# Patient Record
Sex: FEMALE | Race: WHITE | NOT HISPANIC OR LATINO | Employment: OTHER | URBAN - METROPOLITAN AREA
[De-identification: names, ages, dates, MRNs, and addresses within clinical notes are randomized per-mention and may not be internally consistent; named-entity substitution may affect disease eponyms.]

---

## 2021-04-09 ENCOUNTER — HOSPITAL ENCOUNTER (OUTPATIENT)
Facility: MEDICAL CENTER | Age: 56
End: 2021-04-09
Attending: NURSE PRACTITIONER
Payer: MEDICARE

## 2023-08-17 ENCOUNTER — HOSPITAL ENCOUNTER (OUTPATIENT)
Facility: MEDICAL CENTER | Age: 58
End: 2023-08-17
Attending: NEUROLOGICAL SURGERY | Admitting: NEUROLOGICAL SURGERY

## 2023-08-18 ENCOUNTER — APPOINTMENT (OUTPATIENT)
Dept: ADMISSIONS | Facility: MEDICAL CENTER | Age: 58
DRG: 455 | End: 2023-08-18
Attending: NEUROLOGICAL SURGERY
Payer: COMMERCIAL

## 2023-08-23 ENCOUNTER — PRE-ADMISSION TESTING (OUTPATIENT)
Dept: ADMISSIONS | Facility: MEDICAL CENTER | Age: 58
DRG: 455 | End: 2023-08-23
Attending: NEUROLOGICAL SURGERY
Payer: COMMERCIAL

## 2023-08-24 ENCOUNTER — APPOINTMENT (OUTPATIENT)
Dept: ADMISSIONS | Facility: MEDICAL CENTER | Age: 58
DRG: 455 | End: 2023-08-24
Payer: COMMERCIAL

## 2023-08-28 ENCOUNTER — HOSPITAL ENCOUNTER (OUTPATIENT)
Dept: RADIOLOGY | Facility: MEDICAL CENTER | Age: 58
DRG: 455 | End: 2023-08-28
Attending: NEUROLOGICAL SURGERY
Payer: COMMERCIAL

## 2023-08-28 ENCOUNTER — PRE-ADMISSION TESTING (OUTPATIENT)
Dept: ADMISSIONS | Facility: MEDICAL CENTER | Age: 58
DRG: 455 | End: 2023-08-28
Attending: NEUROLOGICAL SURGERY
Payer: COMMERCIAL

## 2023-08-28 DIAGNOSIS — Z01.812 PRE-OPERATIVE LABORATORY EXAMINATION: ICD-10-CM

## 2023-08-28 DIAGNOSIS — Z01.811 PRE-OPERATIVE RESPIRATORY EXAMINATION: ICD-10-CM

## 2023-08-28 DIAGNOSIS — Z01.810 PRE-OPERATIVE CARDIOVASCULAR EXAMINATION: ICD-10-CM

## 2023-08-28 DIAGNOSIS — R82.90 NONSPECIFIC FINDING ON EXAMINATION OF URINE: ICD-10-CM

## 2023-08-28 DIAGNOSIS — R79.1 ABNORMAL COAGULATION PROFILE: ICD-10-CM

## 2023-08-28 DIAGNOSIS — M43.16 SPONDYLOLISTHESIS OF LUMBAR REGION: ICD-10-CM

## 2023-08-28 DIAGNOSIS — R94.31 NONSPECIFIC ABNORMAL ELECTROCARDIOGRAM (ECG) (EKG): ICD-10-CM

## 2023-08-28 LAB
ABO GROUP BLD: NORMAL
ANION GAP SERPL CALC-SCNC: 12 MMOL/L (ref 7–16)
APPEARANCE UR: CLEAR
APTT PPP: 26.3 SEC (ref 24.7–36)
BASOPHILS # BLD AUTO: 0.5 % (ref 0–1.8)
BASOPHILS # BLD: 0.02 K/UL (ref 0–0.12)
BILIRUB UR QL STRIP.AUTO: NEGATIVE
BLD GP AB SCN SERPL QL: NORMAL
BUN SERPL-MCNC: 14 MG/DL (ref 8–22)
CALCIUM SERPL-MCNC: 10.7 MG/DL (ref 8.5–10.5)
CHLORIDE SERPL-SCNC: 101 MMOL/L (ref 96–112)
CO2 SERPL-SCNC: 25 MMOL/L (ref 20–33)
COLOR UR: YELLOW
CREAT SERPL-MCNC: 0.74 MG/DL (ref 0.5–1.4)
EKG IMPRESSION: NORMAL
EOSINOPHIL # BLD AUTO: 0.07 K/UL (ref 0–0.51)
EOSINOPHIL NFR BLD: 1.9 % (ref 0–6.9)
ERYTHROCYTE [DISTWIDTH] IN BLOOD BY AUTOMATED COUNT: 49.9 FL (ref 35.9–50)
GFR SERPLBLD CREATININE-BSD FMLA CKD-EPI: 93 ML/MIN/1.73 M 2
GLUCOSE SERPL-MCNC: 91 MG/DL (ref 65–99)
GLUCOSE UR STRIP.AUTO-MCNC: NEGATIVE MG/DL
HCT VFR BLD AUTO: 42.3 % (ref 37–47)
HGB BLD-MCNC: 13.8 G/DL (ref 12–16)
IMM GRANULOCYTES # BLD AUTO: 0.01 K/UL (ref 0–0.11)
IMM GRANULOCYTES NFR BLD AUTO: 0.3 % (ref 0–0.9)
INR PPP: 0.95 (ref 0.87–1.13)
KETONES UR STRIP.AUTO-MCNC: NEGATIVE MG/DL
LEUKOCYTE ESTERASE UR QL STRIP.AUTO: NEGATIVE
LYMPHOCYTES # BLD AUTO: 1.2 K/UL (ref 1–4.8)
LYMPHOCYTES NFR BLD: 31.7 % (ref 22–41)
MCH RBC QN AUTO: 29.2 PG (ref 27–33)
MCHC RBC AUTO-ENTMCNC: 32.6 G/DL (ref 32.2–35.5)
MCV RBC AUTO: 89.4 FL (ref 81.4–97.8)
MICRO URNS: NORMAL
MONOCYTES # BLD AUTO: 0.37 K/UL (ref 0–0.85)
MONOCYTES NFR BLD AUTO: 9.8 % (ref 0–13.4)
NEUTROPHILS # BLD AUTO: 2.11 K/UL (ref 1.82–7.42)
NEUTROPHILS NFR BLD: 55.8 % (ref 44–72)
NITRITE UR QL STRIP.AUTO: NEGATIVE
NRBC # BLD AUTO: 0 K/UL
NRBC BLD-RTO: 0 /100 WBC (ref 0–0.2)
PH UR STRIP.AUTO: 6.5 [PH] (ref 5–8)
PLATELET # BLD AUTO: 215 K/UL (ref 164–446)
PMV BLD AUTO: 10.9 FL (ref 9–12.9)
POTASSIUM SERPL-SCNC: 3.8 MMOL/L (ref 3.6–5.5)
PROT UR QL STRIP: NEGATIVE MG/DL
PROTHROMBIN TIME: 12.8 SEC (ref 12–14.6)
RBC # BLD AUTO: 4.73 M/UL (ref 4.2–5.4)
RBC UR QL AUTO: NEGATIVE
RH BLD: NORMAL
SODIUM SERPL-SCNC: 138 MMOL/L (ref 135–145)
SP GR UR STRIP.AUTO: 1.01
UROBILINOGEN UR STRIP.AUTO-MCNC: 0.2 MG/DL
WBC # BLD AUTO: 3.8 K/UL (ref 4.8–10.8)

## 2023-08-28 PROCEDURE — 85730 THROMBOPLASTIN TIME PARTIAL: CPT

## 2023-08-28 PROCEDURE — 85610 PROTHROMBIN TIME: CPT

## 2023-08-28 PROCEDURE — 36415 COLL VENOUS BLD VENIPUNCTURE: CPT

## 2023-08-28 PROCEDURE — 71046 X-RAY EXAM CHEST 2 VIEWS: CPT

## 2023-08-28 PROCEDURE — 86850 RBC ANTIBODY SCREEN: CPT

## 2023-08-28 PROCEDURE — 86901 BLOOD TYPING SEROLOGIC RH(D): CPT

## 2023-08-28 PROCEDURE — 86900 BLOOD TYPING SEROLOGIC ABO: CPT

## 2023-08-28 PROCEDURE — 85025 COMPLETE CBC W/AUTO DIFF WBC: CPT

## 2023-08-28 PROCEDURE — 72110 X-RAY EXAM L-2 SPINE 4/>VWS: CPT

## 2023-08-28 PROCEDURE — 81003 URINALYSIS AUTO W/O SCOPE: CPT

## 2023-08-28 PROCEDURE — 80048 BASIC METABOLIC PNL TOTAL CA: CPT

## 2023-08-28 PROCEDURE — 93005 ELECTROCARDIOGRAM TRACING: CPT

## 2023-08-28 PROCEDURE — 93010 ELECTROCARDIOGRAM REPORT: CPT | Performed by: INTERNAL MEDICINE

## 2023-09-04 ENCOUNTER — ANESTHESIA EVENT (OUTPATIENT)
Dept: SURGERY | Facility: MEDICAL CENTER | Age: 58
DRG: 455 | End: 2023-09-04
Payer: COMMERCIAL

## 2023-09-05 ENCOUNTER — APPOINTMENT (OUTPATIENT)
Dept: RADIOLOGY | Facility: MEDICAL CENTER | Age: 58
DRG: 455 | End: 2023-09-05
Attending: NEUROLOGICAL SURGERY
Payer: COMMERCIAL

## 2023-09-05 ENCOUNTER — ANESTHESIA (OUTPATIENT)
Dept: SURGERY | Facility: MEDICAL CENTER | Age: 58
DRG: 455 | End: 2023-09-05
Payer: COMMERCIAL

## 2023-09-05 ENCOUNTER — HOSPITAL ENCOUNTER (INPATIENT)
Facility: MEDICAL CENTER | Age: 58
LOS: 4 days | DRG: 455 | End: 2023-09-09
Attending: NEUROLOGICAL SURGERY | Admitting: NEUROLOGICAL SURGERY
Payer: COMMERCIAL

## 2023-09-05 DIAGNOSIS — G89.18 ACUTE POSTOPERATIVE PAIN: ICD-10-CM

## 2023-09-05 LAB — ABO + RH BLD: NORMAL

## 2023-09-05 PROCEDURE — 700111 HCHG RX REV CODE 636 W/ 250 OVERRIDE (IP): Performed by: PHYSICIAN ASSISTANT

## 2023-09-05 PROCEDURE — 95907 NVR CNDJ TST 1-2 STUDIES: CPT | Performed by: NEUROLOGICAL SURGERY

## 2023-09-05 PROCEDURE — 502000 HCHG MISC OR IMPLANTS RC 0278: Performed by: NEUROLOGICAL SURGERY

## 2023-09-05 PROCEDURE — 110454 HCHG SHELL REV 250: Performed by: NEUROLOGICAL SURGERY

## 2023-09-05 PROCEDURE — 160042 HCHG SURGERY MINUTES - EA ADDL 1 MIN LEVEL 5: Performed by: NEUROLOGICAL SURGERY

## 2023-09-05 PROCEDURE — C1713 ANCHOR/SCREW BN/BN,TIS/BN: HCPCS | Performed by: NEUROLOGICAL SURGERY

## 2023-09-05 PROCEDURE — 700111 HCHG RX REV CODE 636 W/ 250 OVERRIDE (IP): Mod: JZ | Performed by: NEUROLOGICAL SURGERY

## 2023-09-05 PROCEDURE — 700101 HCHG RX REV CODE 250: Performed by: NEUROLOGICAL SURGERY

## 2023-09-05 PROCEDURE — 700102 HCHG RX REV CODE 250 W/ 637 OVERRIDE(OP): Performed by: PHYSICIAN ASSISTANT

## 2023-09-05 PROCEDURE — 160035 HCHG PACU - 1ST 60 MINS PHASE I: Performed by: NEUROLOGICAL SURGERY

## 2023-09-05 PROCEDURE — 0SG00A0 FUSION OF LUMBAR VERTEBRAL JOINT WITH INTERBODY FUSION DEVICE, ANTERIOR APPROACH, ANTERIOR COLUMN, OPEN APPROACH: ICD-10-PCS | Performed by: NEUROLOGICAL SURGERY

## 2023-09-05 PROCEDURE — 160048 HCHG OR STATISTICAL LEVEL 1-5: Performed by: NEUROLOGICAL SURGERY

## 2023-09-05 PROCEDURE — 160009 HCHG ANES TIME/MIN: Performed by: NEUROLOGICAL SURGERY

## 2023-09-05 PROCEDURE — 95939 C MOTOR EVOKED UPR&LWR LIMBS: CPT | Performed by: NEUROLOGICAL SURGERY

## 2023-09-05 PROCEDURE — 95940 IONM IN OPERATNG ROOM 15 MIN: CPT | Performed by: NEUROLOGICAL SURGERY

## 2023-09-05 PROCEDURE — 700105 HCHG RX REV CODE 258: Performed by: NEUROLOGICAL SURGERY

## 2023-09-05 PROCEDURE — 700111 HCHG RX REV CODE 636 W/ 250 OVERRIDE (IP): Performed by: ANESTHESIOLOGY

## 2023-09-05 PROCEDURE — 160031 HCHG SURGERY MINUTES - 1ST 30 MINS LEVEL 5: Performed by: NEUROLOGICAL SURGERY

## 2023-09-05 PROCEDURE — 700101 HCHG RX REV CODE 250: Performed by: ANESTHESIOLOGY

## 2023-09-05 PROCEDURE — A9270 NON-COVERED ITEM OR SERVICE: HCPCS | Performed by: PHYSICIAN ASSISTANT

## 2023-09-05 PROCEDURE — 700105 HCHG RX REV CODE 258: Performed by: ANESTHESIOLOGY

## 2023-09-05 PROCEDURE — 160002 HCHG RECOVERY MINUTES (STAT): Performed by: NEUROLOGICAL SURGERY

## 2023-09-05 PROCEDURE — 95955 EEG DURING SURGERY: CPT | Performed by: NEUROLOGICAL SURGERY

## 2023-09-05 PROCEDURE — 95938 SOMATOSENSORY TESTING: CPT | Performed by: NEUROLOGICAL SURGERY

## 2023-09-05 PROCEDURE — 700105 HCHG RX REV CODE 258: Performed by: PHYSICIAN ASSISTANT

## 2023-09-05 PROCEDURE — 0SB20ZZ EXCISION OF LUMBAR VERTEBRAL DISC, OPEN APPROACH: ICD-10-PCS | Performed by: NEUROLOGICAL SURGERY

## 2023-09-05 PROCEDURE — 160036 HCHG PACU - EA ADDL 30 MINS PHASE I: Performed by: NEUROLOGICAL SURGERY

## 2023-09-05 PROCEDURE — A9270 NON-COVERED ITEM OR SERVICE: HCPCS | Performed by: ANESTHESIOLOGY

## 2023-09-05 PROCEDURE — 770001 HCHG ROOM/CARE - MED/SURG/GYN PRIV*

## 2023-09-05 PROCEDURE — 95861 NEEDLE EMG 2 EXTREMITIES: CPT | Performed by: NEUROLOGICAL SURGERY

## 2023-09-05 PROCEDURE — 36415 COLL VENOUS BLD VENIPUNCTURE: CPT

## 2023-09-05 PROCEDURE — 700102 HCHG RX REV CODE 250 W/ 637 OVERRIDE(OP): Performed by: ANESTHESIOLOGY

## 2023-09-05 PROCEDURE — 72100 X-RAY EXAM L-S SPINE 2/3 VWS: CPT

## 2023-09-05 DEVICE — GRAFT BONE OSTEOSTRAND PLUS LARGE 10CC (1EA): Type: IMPLANTABLE DEVICE | Site: BACK | Status: FUNCTIONAL

## 2023-09-05 DEVICE — IMPLANT LATERAL 3D 23MM X 12MM X 45MM 10 DEG (1EA0: Type: IMPLANTABLE DEVICE | Site: BACK | Status: FUNCTIONAL

## 2023-09-05 DEVICE — SCREW 5.5 X 50MM VARIABLE RP1 ANGLE: Type: IMPLANTABLE DEVICE | Site: BACK | Status: FUNCTIONAL

## 2023-09-05 RX ORDER — METHOCARBAMOL 750 MG/1
750 TABLET, FILM COATED ORAL EVERY 8 HOURS PRN
Status: DISCONTINUED | OUTPATIENT
Start: 2023-09-05 | End: 2023-09-07

## 2023-09-05 RX ORDER — BISACODYL 10 MG
10 SUPPOSITORY, RECTAL RECTAL
Status: DISCONTINUED | OUTPATIENT
Start: 2023-09-05 | End: 2023-09-09 | Stop reason: HOSPADM

## 2023-09-05 RX ORDER — LABETALOL HYDROCHLORIDE 5 MG/ML
5 INJECTION, SOLUTION INTRAVENOUS
Status: DISCONTINUED | OUTPATIENT
Start: 2023-09-05 | End: 2023-09-05

## 2023-09-05 RX ORDER — HYDROMORPHONE HYDROCHLORIDE 1 MG/ML
0.2 INJECTION, SOLUTION INTRAMUSCULAR; INTRAVENOUS; SUBCUTANEOUS
Status: DISCONTINUED | OUTPATIENT
Start: 2023-09-05 | End: 2023-09-05 | Stop reason: HOSPADM

## 2023-09-05 RX ORDER — DOCUSATE SODIUM 100 MG/1
100 CAPSULE, LIQUID FILLED ORAL 2 TIMES DAILY
Status: DISCONTINUED | OUTPATIENT
Start: 2023-09-05 | End: 2023-09-09 | Stop reason: HOSPADM

## 2023-09-05 RX ORDER — ONDANSETRON 4 MG/1
4 TABLET, ORALLY DISINTEGRATING ORAL EVERY 4 HOURS PRN
Status: DISCONTINUED | OUTPATIENT
Start: 2023-09-05 | End: 2023-09-09 | Stop reason: HOSPADM

## 2023-09-05 RX ORDER — PROCHLORPERAZINE EDISYLATE 5 MG/ML
5-10 INJECTION INTRAMUSCULAR; INTRAVENOUS EVERY 4 HOURS PRN
Status: DISCONTINUED | OUTPATIENT
Start: 2023-09-05 | End: 2023-09-09 | Stop reason: HOSPADM

## 2023-09-05 RX ORDER — ACETAMINOPHEN 500 MG
1000 TABLET ORAL ONCE
Status: COMPLETED | OUTPATIENT
Start: 2023-09-05 | End: 2023-09-05

## 2023-09-05 RX ORDER — LABETALOL HYDROCHLORIDE 5 MG/ML
10 INJECTION, SOLUTION INTRAVENOUS
Status: DISCONTINUED | OUTPATIENT
Start: 2023-09-05 | End: 2023-09-09 | Stop reason: HOSPADM

## 2023-09-05 RX ORDER — AMOXICILLIN 250 MG
1 CAPSULE ORAL NIGHTLY
Status: DISCONTINUED | OUTPATIENT
Start: 2023-09-05 | End: 2023-09-09 | Stop reason: HOSPADM

## 2023-09-05 RX ORDER — ENEMA 19; 7 G/133ML; G/133ML
1 ENEMA RECTAL
Status: DISCONTINUED | OUTPATIENT
Start: 2023-09-05 | End: 2023-09-09 | Stop reason: HOSPADM

## 2023-09-05 RX ORDER — GABAPENTIN 300 MG/1
300 CAPSULE ORAL ONCE
Status: COMPLETED | OUTPATIENT
Start: 2023-09-05 | End: 2023-09-05

## 2023-09-05 RX ORDER — PROMETHAZINE HYDROCHLORIDE 25 MG/1
12.5-25 TABLET ORAL EVERY 4 HOURS PRN
Status: DISCONTINUED | OUTPATIENT
Start: 2023-09-05 | End: 2023-09-09 | Stop reason: HOSPADM

## 2023-09-05 RX ORDER — DIPHENHYDRAMINE HYDROCHLORIDE 50 MG/ML
12.5 INJECTION INTRAMUSCULAR; INTRAVENOUS
Status: DISCONTINUED | OUTPATIENT
Start: 2023-09-05 | End: 2023-09-05 | Stop reason: HOSPADM

## 2023-09-05 RX ORDER — EPHEDRINE SULFATE 50 MG/ML
5 INJECTION, SOLUTION INTRAVENOUS
Status: DISCONTINUED | OUTPATIENT
Start: 2023-09-05 | End: 2023-09-05 | Stop reason: HOSPADM

## 2023-09-05 RX ORDER — MIDAZOLAM HYDROCHLORIDE 1 MG/ML
1 INJECTION INTRAMUSCULAR; INTRAVENOUS
Status: DISCONTINUED | OUTPATIENT
Start: 2023-09-05 | End: 2023-09-05 | Stop reason: HOSPADM

## 2023-09-05 RX ORDER — OXYCODONE HCL 10 MG/1
10 TABLET, FILM COATED, EXTENDED RELEASE ORAL ONCE
Status: COMPLETED | OUTPATIENT
Start: 2023-09-05 | End: 2023-09-05

## 2023-09-05 RX ORDER — CEFAZOLIN SODIUM 1 G/3ML
INJECTION, POWDER, FOR SOLUTION INTRAMUSCULAR; INTRAVENOUS
Status: DISCONTINUED | OUTPATIENT
Start: 2023-09-05 | End: 2023-09-05 | Stop reason: HOSPADM

## 2023-09-05 RX ORDER — DIPHENHYDRAMINE HCL 25 MG
25 TABLET ORAL EVERY 6 HOURS PRN
Status: DISCONTINUED | OUTPATIENT
Start: 2023-09-05 | End: 2023-09-09 | Stop reason: HOSPADM

## 2023-09-05 RX ORDER — HYDROCODONE BITARTRATE AND ACETAMINOPHEN 10; 325 MG/1; MG/1
1 TABLET ORAL EVERY 4 HOURS PRN
Status: DISCONTINUED | OUTPATIENT
Start: 2023-09-05 | End: 2023-09-09 | Stop reason: HOSPADM

## 2023-09-05 RX ORDER — ONDANSETRON 2 MG/ML
4 INJECTION INTRAMUSCULAR; INTRAVENOUS EVERY 4 HOURS PRN
Status: DISCONTINUED | OUTPATIENT
Start: 2023-09-05 | End: 2023-09-09 | Stop reason: HOSPADM

## 2023-09-05 RX ORDER — HALOPERIDOL 5 MG/ML
1 INJECTION INTRAMUSCULAR
Status: DISCONTINUED | OUTPATIENT
Start: 2023-09-05 | End: 2023-09-05 | Stop reason: HOSPADM

## 2023-09-05 RX ORDER — SODIUM CHLORIDE, SODIUM LACTATE, POTASSIUM CHLORIDE, CALCIUM CHLORIDE 600; 310; 30; 20 MG/100ML; MG/100ML; MG/100ML; MG/100ML
INJECTION, SOLUTION INTRAVENOUS CONTINUOUS
Status: ACTIVE | OUTPATIENT
Start: 2023-09-05 | End: 2023-09-05

## 2023-09-05 RX ORDER — AMOXICILLIN 250 MG
1 CAPSULE ORAL
Status: DISCONTINUED | OUTPATIENT
Start: 2023-09-05 | End: 2023-09-09 | Stop reason: HOSPADM

## 2023-09-05 RX ORDER — CALCIUM CARBONATE 500 MG/1
500 TABLET, CHEWABLE ORAL 2 TIMES DAILY
Status: DISCONTINUED | OUTPATIENT
Start: 2023-09-05 | End: 2023-09-09 | Stop reason: HOSPADM

## 2023-09-05 RX ORDER — HYDROMORPHONE HYDROCHLORIDE 1 MG/ML
0.5 INJECTION, SOLUTION INTRAMUSCULAR; INTRAVENOUS; SUBCUTANEOUS
Status: DISCONTINUED | OUTPATIENT
Start: 2023-09-05 | End: 2023-09-07

## 2023-09-05 RX ORDER — ONDANSETRON 2 MG/ML
4 INJECTION INTRAMUSCULAR; INTRAVENOUS
Status: COMPLETED | OUTPATIENT
Start: 2023-09-05 | End: 2023-09-05

## 2023-09-05 RX ORDER — SODIUM CHLORIDE 9 MG/ML
INJECTION, SOLUTION INTRAVENOUS CONTINUOUS
Status: DISCONTINUED | OUTPATIENT
Start: 2023-09-05 | End: 2023-09-09 | Stop reason: HOSPADM

## 2023-09-05 RX ORDER — DEXAMETHASONE SODIUM PHOSPHATE 4 MG/ML
INJECTION, SOLUTION INTRA-ARTICULAR; INTRALESIONAL; INTRAMUSCULAR; INTRAVENOUS; SOFT TISSUE PRN
Status: DISCONTINUED | OUTPATIENT
Start: 2023-09-05 | End: 2023-09-05 | Stop reason: SURG

## 2023-09-05 RX ORDER — POLYETHYLENE GLYCOL 3350 17 G/17G
1 POWDER, FOR SOLUTION ORAL 2 TIMES DAILY PRN
Status: DISCONTINUED | OUTPATIENT
Start: 2023-09-05 | End: 2023-09-07

## 2023-09-05 RX ORDER — PROMETHAZINE HYDROCHLORIDE 25 MG/1
12.5-25 SUPPOSITORY RECTAL EVERY 4 HOURS PRN
Status: DISCONTINUED | OUTPATIENT
Start: 2023-09-05 | End: 2023-09-09 | Stop reason: HOSPADM

## 2023-09-05 RX ORDER — ONDANSETRON 2 MG/ML
INJECTION INTRAMUSCULAR; INTRAVENOUS PRN
Status: DISCONTINUED | OUTPATIENT
Start: 2023-09-05 | End: 2023-09-05 | Stop reason: SURG

## 2023-09-05 RX ORDER — MIDAZOLAM HYDROCHLORIDE 1 MG/ML
INJECTION INTRAMUSCULAR; INTRAVENOUS PRN
Status: DISCONTINUED | OUTPATIENT
Start: 2023-09-05 | End: 2023-09-05 | Stop reason: SURG

## 2023-09-05 RX ORDER — HYDROMORPHONE HYDROCHLORIDE 1 MG/ML
0.1 INJECTION, SOLUTION INTRAMUSCULAR; INTRAVENOUS; SUBCUTANEOUS
Status: DISCONTINUED | OUTPATIENT
Start: 2023-09-05 | End: 2023-09-05 | Stop reason: HOSPADM

## 2023-09-05 RX ORDER — LIDOCAINE HYDROCHLORIDE 20 MG/ML
INJECTION, SOLUTION EPIDURAL; INFILTRATION; INTRACAUDAL; PERINEURAL PRN
Status: DISCONTINUED | OUTPATIENT
Start: 2023-09-05 | End: 2023-09-05 | Stop reason: SURG

## 2023-09-05 RX ORDER — PHENYLEPHRINE HYDROCHLORIDE 10 MG/ML
INJECTION, SOLUTION INTRAMUSCULAR; INTRAVENOUS; SUBCUTANEOUS PRN
Status: DISCONTINUED | OUTPATIENT
Start: 2023-09-05 | End: 2023-09-05 | Stop reason: SURG

## 2023-09-05 RX ORDER — SODIUM CHLORIDE, SODIUM LACTATE, POTASSIUM CHLORIDE, CALCIUM CHLORIDE 600; 310; 30; 20 MG/100ML; MG/100ML; MG/100ML; MG/100ML
INJECTION, SOLUTION INTRAVENOUS CONTINUOUS
Status: DISCONTINUED | OUTPATIENT
Start: 2023-09-05 | End: 2023-09-05 | Stop reason: HOSPADM

## 2023-09-05 RX ORDER — MEPERIDINE HYDROCHLORIDE 25 MG/ML
12.5 INJECTION INTRAMUSCULAR; INTRAVENOUS; SUBCUTANEOUS
Status: DISCONTINUED | OUTPATIENT
Start: 2023-09-05 | End: 2023-09-05 | Stop reason: HOSPADM

## 2023-09-05 RX ORDER — HYDRALAZINE HYDROCHLORIDE 20 MG/ML
5 INJECTION INTRAMUSCULAR; INTRAVENOUS
Status: DISCONTINUED | OUTPATIENT
Start: 2023-09-05 | End: 2023-09-05 | Stop reason: HOSPADM

## 2023-09-05 RX ORDER — CEFAZOLIN SODIUM 1 G/3ML
INJECTION, POWDER, FOR SOLUTION INTRAMUSCULAR; INTRAVENOUS PRN
Status: DISCONTINUED | OUTPATIENT
Start: 2023-09-05 | End: 2023-09-05 | Stop reason: SURG

## 2023-09-05 RX ORDER — HYDROMORPHONE HYDROCHLORIDE 2 MG/ML
INJECTION, SOLUTION INTRAMUSCULAR; INTRAVENOUS; SUBCUTANEOUS PRN
Status: DISCONTINUED | OUTPATIENT
Start: 2023-09-05 | End: 2023-09-05 | Stop reason: SURG

## 2023-09-05 RX ORDER — DIPHENHYDRAMINE HYDROCHLORIDE 50 MG/ML
25 INJECTION INTRAMUSCULAR; INTRAVENOUS EVERY 6 HOURS PRN
Status: DISCONTINUED | OUTPATIENT
Start: 2023-09-05 | End: 2023-09-09 | Stop reason: HOSPADM

## 2023-09-05 RX ORDER — BUPIVACAINE HYDROCHLORIDE AND EPINEPHRINE 5; 5 MG/ML; UG/ML
INJECTION, SOLUTION PERINEURAL
Status: DISCONTINUED | OUTPATIENT
Start: 2023-09-05 | End: 2023-09-05 | Stop reason: HOSPADM

## 2023-09-05 RX ORDER — ENOXAPARIN SODIUM 100 MG/ML
40 INJECTION SUBCUTANEOUS DAILY
Status: DISCONTINUED | OUTPATIENT
Start: 2023-09-06 | End: 2023-09-07

## 2023-09-05 RX ORDER — ALPRAZOLAM 0.25 MG/1
0.25 TABLET ORAL 2 TIMES DAILY PRN
Status: DISCONTINUED | OUTPATIENT
Start: 2023-09-05 | End: 2023-09-09 | Stop reason: HOSPADM

## 2023-09-05 RX ORDER — HYDROMORPHONE HYDROCHLORIDE 1 MG/ML
0.4 INJECTION, SOLUTION INTRAMUSCULAR; INTRAVENOUS; SUBCUTANEOUS
Status: DISCONTINUED | OUTPATIENT
Start: 2023-09-05 | End: 2023-09-05 | Stop reason: HOSPADM

## 2023-09-05 RX ORDER — OXYCODONE HYDROCHLORIDE 5 MG/1
5 TABLET ORAL EVERY 4 HOURS PRN
Status: DISCONTINUED | OUTPATIENT
Start: 2023-09-05 | End: 2023-09-09 | Stop reason: HOSPADM

## 2023-09-05 RX ADMIN — ANTACID TABLETS 500 MG: 500 TABLET, CHEWABLE ORAL at 17:30

## 2023-09-05 RX ADMIN — GABAPENTIN 300 MG: 300 CAPSULE ORAL at 11:11

## 2023-09-05 RX ADMIN — FENTANYL CITRATE 100 MCG: 50 INJECTION, SOLUTION INTRAMUSCULAR; INTRAVENOUS at 11:53

## 2023-09-05 RX ADMIN — MEPERIDINE HYDROCHLORIDE 12.5 MG: 25 INJECTION INTRAMUSCULAR; INTRAVENOUS; SUBCUTANEOUS at 14:20

## 2023-09-05 RX ADMIN — HYDROCODONE BITARTRATE AND ACETAMINOPHEN 1 TABLET: 10; 325 TABLET ORAL at 17:30

## 2023-09-05 RX ADMIN — SODIUM CHLORIDE, POTASSIUM CHLORIDE, SODIUM LACTATE AND CALCIUM CHLORIDE: 600; 310; 30; 20 INJECTION, SOLUTION INTRAVENOUS at 11:50

## 2023-09-05 RX ADMIN — PROPOFOL 100 MCG/KG/MIN: 10 INJECTION, EMULSION INTRAVENOUS at 12:42

## 2023-09-05 RX ADMIN — SENNOSIDES AND DOCUSATE SODIUM 1 TABLET: 50; 8.6 TABLET ORAL at 20:39

## 2023-09-05 RX ADMIN — HYDROMORPHONE HYDROCHLORIDE 1 MG: 2 INJECTION INTRAMUSCULAR; INTRAVENOUS; SUBCUTANEOUS at 12:14

## 2023-09-05 RX ADMIN — DOCUSATE SODIUM 100 MG: 100 CAPSULE, LIQUID FILLED ORAL at 17:30

## 2023-09-05 RX ADMIN — LIDOCAINE HYDROCHLORIDE 0.5 ML: 10 INJECTION, SOLUTION EPIDURAL; INFILTRATION; INTRACAUDAL at 11:11

## 2023-09-05 RX ADMIN — MEPERIDINE HYDROCHLORIDE 12.5 MG: 25 INJECTION INTRAMUSCULAR; INTRAVENOUS; SUBCUTANEOUS at 14:13

## 2023-09-05 RX ADMIN — PROPOFOL 150 MG: 10 INJECTION, EMULSION INTRAVENOUS at 11:55

## 2023-09-05 RX ADMIN — MIDAZOLAM 2 MG: 1 INJECTION, SOLUTION INTRAMUSCULAR; INTRAVENOUS at 11:50

## 2023-09-05 RX ADMIN — ROCURONIUM BROMIDE 35 MG: 10 INJECTION, SOLUTION INTRAVENOUS at 11:55

## 2023-09-05 RX ADMIN — METHOCARBAMOL 1000 MG: 100 INJECTION, SOLUTION INTRAMUSCULAR; INTRAVENOUS at 13:34

## 2023-09-05 RX ADMIN — HALOPERIDOL LACTATE 1 MG: 5 INJECTION, SOLUTION INTRAMUSCULAR at 14:01

## 2023-09-05 RX ADMIN — CEFAZOLIN 2 G: 2 INJECTION, POWDER, FOR SOLUTION INTRAMUSCULAR; INTRAVENOUS at 20:44

## 2023-09-05 RX ADMIN — LIDOCAINE HYDROCHLORIDE 50 MG: 20 INJECTION, SOLUTION EPIDURAL; INFILTRATION; INTRACAUDAL at 11:53

## 2023-09-05 RX ADMIN — KETAMINE HYDROCHLORIDE 25 MG: 100 INJECTION INTRAMUSCULAR; INTRAVENOUS at 12:14

## 2023-09-05 RX ADMIN — ACETAMINOPHEN 1000 MG: 500 TABLET, FILM COATED ORAL at 11:11

## 2023-09-05 RX ADMIN — SODIUM CHLORIDE: 9 INJECTION, SOLUTION INTRAVENOUS at 17:36

## 2023-09-05 RX ADMIN — ONDANSETRON 4 MG: 2 INJECTION INTRAMUSCULAR; INTRAVENOUS at 13:21

## 2023-09-05 RX ADMIN — ONDANSETRON 4 MG: 2 INJECTION INTRAMUSCULAR; INTRAVENOUS at 14:01

## 2023-09-05 RX ADMIN — FENTANYL CITRATE 25 MCG: 50 INJECTION, SOLUTION INTRAMUSCULAR; INTRAVENOUS at 12:45

## 2023-09-05 RX ADMIN — OXYCODONE HYDROCHLORIDE 10 MG: 10 TABLET, FILM COATED, EXTENDED RELEASE ORAL at 11:11

## 2023-09-05 RX ADMIN — PHENYLEPHRINE HYDROCHLORIDE 50 MCG: 10 INJECTION INTRAVENOUS at 12:13

## 2023-09-05 RX ADMIN — CEFAZOLIN 2 G: 1 INJECTION, POWDER, FOR SOLUTION INTRAMUSCULAR; INTRAVENOUS at 11:50

## 2023-09-05 RX ADMIN — SUGAMMADEX 200 MG: 100 INJECTION, SOLUTION INTRAVENOUS at 13:21

## 2023-09-05 RX ADMIN — DEXAMETHASONE SODIUM PHOSPHATE 8 MG: 4 INJECTION INTRA-ARTICULAR; INTRALESIONAL; INTRAMUSCULAR; INTRAVENOUS; SOFT TISSUE at 11:59

## 2023-09-05 ASSESSMENT — PAIN DESCRIPTION - PAIN TYPE
TYPE: ACUTE PAIN
TYPE: ACUTE PAIN;SURGICAL PAIN
TYPE: SURGICAL PAIN

## 2023-09-05 NOTE — ANESTHESIA POSTPROCEDURE EVALUATION
Patient: Blanca Alexander    Procedure Summary     Date: 09/05/23 Room / Location: [facility] 07 / SURGERY [facility]    Anesthesia Start: 1150 Anesthesia Stop: 1337    Procedure: STAGE #1 - LUMBAR 3-4 EXTREME LATERAL INTERBODY FUSION LEFT SIDED APPROACH (Left: Back) Diagnosis: (LUMBAR SPONDYLOLISTHESIS)    Surgeons: Odin Monzon M.D. Responsible Provider: Danilo Morgan M.D.    Anesthesia Type: general ASA Status: 2          Final Anesthesia Type: general  Last vitals  BP   Blood Pressure: 125/73    Temp   36.5 °C (97.7 °F)    Pulse   87   Resp   16    SpO2   97 %      Anesthesia Post Evaluation    Patient location during evaluation: PACU  Patient participation: complete - patient participated  Level of consciousness: awake and alert    Airway patency: patent  Anesthetic complications: no  Cardiovascular status: hemodynamically stable  Respiratory status: acceptable  Hydration status: euvolemic    PONV: none          No notable events documented.     Nurse Pain Score: 0 (NPRS)

## 2023-09-05 NOTE — ANESTHESIA TIME REPORT
Anesthesia Start and Stop Event Times     Date Time Event    9/5/2023 1133 Ready for Procedure     1150 Anesthesia Start     1337 Anesthesia Stop        Responsible Staff  09/05/23    Name Role Begin End    Danilo Morgan M.D. Anesth 1150 1337        Overtime Reason:  no overtime (within assigned shift)    Comments:

## 2023-09-05 NOTE — PROGRESS NOTES
2 RN skin check done w/JOVAN Burleson.   Sx incision to left flank-dressing CDI.   No other skin breakdown noted.

## 2023-09-05 NOTE — ANESTHESIA PREPROCEDURE EVALUATION
Case: [id number] Date/Time: 09/05/23 1245    Procedure: STAGE #1 - LUMBAR 3-4 EXTREME LATERAL INTERBODY FUSION LEFT SIDED APPROACH    Pre-op diagnosis: LUMBAR SPONDYLOLISTHESIS    Location: [facility] OR 07 / SURGERY [facility]    Surgeons: PARISH Taylor H&P:  PAST MEDICAL HISTORY:   58 y.o. female who presents for Procedure(s):  STAGE #1 - LUMBAR 3-4 EXTREME LATERAL INTERBODY FUSION LEFT SIDED APPROACH.  She has current and past medical problems significant for:    Patient denies history of seizures or strokes  Patient denies history of diabetes or thyroid disease  Patient denies history of Asthma or COPD  Patient denies history of GERD or esophageal disease  Patient denies history of JODIE  Patient denies history of renal failure  Patient denies history of bleeding disorders    Patient denies history of previous MI, chest pain or shortness of breath  Able to climb 2 flights of stair without dyspnea or angina, > 4 METs     Patient denies history of complications with anesthesia    Past Medical History:   Diagnosis Date   • Acute nasopharyngitis 07/31/2023    24 hour flu - 101.80 temp   • High cholesterol 08/23/2023    diet controlled       SMOKING/ALCOHOL/RECREATIONAL DRUG USE:  Social History     Tobacco Use   • Smoking status: Never   • Smokeless tobacco: Never   Vaping Use   • Vaping Use: Never used   Substance Use Topics   • Alcohol use: Not Currently     Comment: I may have 2 glasses of wine or shot of liquor per month.   • Drug use: Never     Social History     Substance and Sexual Activity   Drug Use Never       PAST SURGICAL HISTORY:  Past Surgical History:   Procedure Laterality Date   • OTHER NEUROLOGICAL SURG  11/20/2015    Lumbar Fusion TLIF   • OTHER  08/16/2009    Right ACL Surgery       ALLERGIES:   No Known Allergies    MEDICATIONS:  No current facility-administered medications on file prior to encounter.     No current outpatient medications on file prior to encounter.       LABS:  No  "results for input(s): \"WBC\", \"RBC\", \"HEMOGLOBIN\", \"HEMATOCRIT\", \"MCV\", \"MCH\", \"RDW\", \"PLATELETCT\", \"MPV\", \"NEUTSPOLYS\", \"LYMPHOCYTES\", \"MONOCYTES\", \"EOSINOPHILS\", \"BASOPHILS\", \"RBCMORPHOLO\" in the last 72 hours.   Lab Results   Component Value Date/Time    SODIUM 138 08/28/2023 1304    POTASSIUM 3.8 08/28/2023 1304    CHLORIDE 101 08/28/2023 1304    CO2 25 08/28/2023 1304    GLUCOSE 91 08/28/2023 1304    BUN 14 08/28/2023 1304    CALCIUM 10.7 (H) 08/28/2023 1304         PREVIOUS ANESTHETICS: See EMR  __________________________________________    Relevant Problems   No relevant active problems       Physical Exam    Airway   Mallampati: II  TM distance: >3 FB  Neck ROM: full       Cardiovascular - normal exam  Rhythm: regular  Rate: normal  (-) murmur     Dental - normal exam           Pulmonary - normal exam  Breath sounds clear to auscultation     Abdominal    Neurological - normal exam                 Anesthesia Plan    ASA 2       Plan - general       Airway plan will be ETT          Induction: intravenous    Postoperative Plan: Postoperative administration of opioids is intended.    Pertinent diagnostic labs and testing reviewed    Informed Consent:    Anesthetic plan and risks discussed with patient.    Use of blood products discussed with: patient whom consented to blood products.         "

## 2023-09-05 NOTE — OR NURSING
Patient arrived to PACU from OR in stable condition.   Report received at 1326.   VSS and initial assessment complete. Medicated for pain per anesthesia. Robaxin from OR hung.  1400 medicated for nausea per orders  1415 medicated for shivering. Pain tolerable and nausea subsided.  1500 pt stable and ready for floor status.    Report given to Fartun ALDANA opportunity given for questions.   Patient discharged to T3 ortho in stable condition, consistent with preoperative status.

## 2023-09-05 NOTE — ANESTHESIA PROCEDURE NOTES
Airway    Date/Time: 9/5/2023 11:58 AM    Performed by: Danilo Morgan M.D.  Authorized by: Danilo Morgan M.D.    Location:  OR  Urgency:  Elective  Indications for Airway Management:  Anesthesia      Spontaneous Ventilation: absent    Sedation Level:  Deep  Preoxygenated: Yes    Patient Position:  Sniffing  Mask Difficulty Assessment:  2 - vent by mask + OA or adjuvant +/- NMBA  Final Airway Type:  Endotracheal airway  Final Endotracheal Airway:  ETT  Cuffed: Yes    Technique Used for Successful ETT Placement:  Direct laryngoscopy    Insertion Site:  Oral  Blade Type:  Ryne  Laryngoscope Blade/Videolaryngoscope Blade Size:  3  ETT Size (mm):  7.0  Measured from:  Lips  ETT to Lips (cm):  21  Placement Verified by: auscultation and capnometry    Cormack-Lehane Classification:  Grade IIa - partial view of glottis  Number of Attempts at Approach:  1   Atraumatic x1 attempt

## 2023-09-05 NOTE — OR SURGEON
Immediate Post OP Note    PreOp Diagnosis: L3-4 DDD, low back pain.       PostOp Diagnosis: Same.       Procedure(s):  STAGE #1 - LUMBAR 3-4 EXTREME LATERAL INTERBODY FUSION LEFT SIDED APPROACH - Wound Class: Clean    Surgeon(s):  Odin Monzon M.D.    Anesthesiologist/Type of Anesthesia:  Anesthesiologist: Danilo Morgan M.D./General    Surgical Staff:  Assistant: Jeovanny Lazo P.A.-C.  Circulator: Michelle Albarado R.N.; Jazmyne Lam R.N.  Scrub Person: Zuleika Helton; Brenda Harper  Radiology Technologist: Agnieszka Carbajal    Specimens removed if any:  * No specimens in log *    Estimated Blood Loss: < 40 cc     Findings: L3-4 DDD, see dictation.     Complications: None.        9/5/2023 1:29 PM Jeovanny Lazo P.A.-C.

## 2023-09-06 ENCOUNTER — HOSPITAL ENCOUNTER (OUTPATIENT)
Dept: RADIOLOGY | Facility: MEDICAL CENTER | Age: 58
End: 2023-09-06
Attending: PHYSICIAN ASSISTANT
Payer: COMMERCIAL

## 2023-09-06 LAB
ANION GAP SERPL CALC-SCNC: 8 MMOL/L (ref 7–16)
BUN SERPL-MCNC: 13 MG/DL (ref 8–22)
CALCIUM SERPL-MCNC: 9.2 MG/DL (ref 8.5–10.5)
CHLORIDE SERPL-SCNC: 106 MMOL/L (ref 96–112)
CO2 SERPL-SCNC: 26 MMOL/L (ref 20–33)
CREAT SERPL-MCNC: 0.66 MG/DL (ref 0.5–1.4)
ERYTHROCYTE [DISTWIDTH] IN BLOOD BY AUTOMATED COUNT: 49.3 FL (ref 35.9–50)
GFR SERPLBLD CREATININE-BSD FMLA CKD-EPI: 101 ML/MIN/1.73 M 2
GLUCOSE SERPL-MCNC: 148 MG/DL (ref 65–99)
HCT VFR BLD AUTO: 37.1 % (ref 37–47)
HGB BLD-MCNC: 12 G/DL (ref 12–16)
MCH RBC QN AUTO: 28.8 PG (ref 27–33)
MCHC RBC AUTO-ENTMCNC: 32.3 G/DL (ref 32.2–35.5)
MCV RBC AUTO: 89.2 FL (ref 81.4–97.8)
PLATELET # BLD AUTO: 211 K/UL (ref 164–446)
PMV BLD AUTO: 10.4 FL (ref 9–12.9)
POTASSIUM SERPL-SCNC: 5.1 MMOL/L (ref 3.6–5.5)
RBC # BLD AUTO: 4.16 M/UL (ref 4.2–5.4)
SODIUM SERPL-SCNC: 140 MMOL/L (ref 135–145)
WBC # BLD AUTO: 8 K/UL (ref 4.8–10.8)

## 2023-09-06 PROCEDURE — 700111 HCHG RX REV CODE 636 W/ 250 OVERRIDE (IP): Performed by: PHYSICIAN ASSISTANT

## 2023-09-06 PROCEDURE — A9270 NON-COVERED ITEM OR SERVICE: HCPCS | Performed by: PHYSICIAN ASSISTANT

## 2023-09-06 PROCEDURE — 80048 BASIC METABOLIC PNL TOTAL CA: CPT

## 2023-09-06 PROCEDURE — 700102 HCHG RX REV CODE 250 W/ 637 OVERRIDE(OP): Performed by: PHYSICIAN ASSISTANT

## 2023-09-06 PROCEDURE — 770001 HCHG ROOM/CARE - MED/SURG/GYN PRIV*

## 2023-09-06 PROCEDURE — 700105 HCHG RX REV CODE 258: Performed by: PHYSICIAN ASSISTANT

## 2023-09-06 PROCEDURE — 85027 COMPLETE CBC AUTOMATED: CPT

## 2023-09-06 PROCEDURE — 72131 CT LUMBAR SPINE W/O DYE: CPT

## 2023-09-06 PROCEDURE — 36415 COLL VENOUS BLD VENIPUNCTURE: CPT

## 2023-09-06 PROCEDURE — 700111 HCHG RX REV CODE 636 W/ 250 OVERRIDE (IP): Mod: JZ | Performed by: STUDENT IN AN ORGANIZED HEALTH CARE EDUCATION/TRAINING PROGRAM

## 2023-09-06 RX ORDER — ENOXAPARIN SODIUM 100 MG/ML
40 INJECTION SUBCUTANEOUS ONCE
Status: COMPLETED | OUTPATIENT
Start: 2023-09-06 | End: 2023-09-06

## 2023-09-06 RX ADMIN — ANTACID TABLETS 500 MG: 500 TABLET, CHEWABLE ORAL at 16:45

## 2023-09-06 RX ADMIN — ENOXAPARIN SODIUM 40 MG: 100 INJECTION SUBCUTANEOUS at 11:28

## 2023-09-06 RX ADMIN — DOCUSATE SODIUM 100 MG: 100 CAPSULE, LIQUID FILLED ORAL at 16:45

## 2023-09-06 RX ADMIN — OXYCODONE HYDROCHLORIDE 5 MG: 5 TABLET ORAL at 16:45

## 2023-09-06 RX ADMIN — SODIUM CHLORIDE: 9 INJECTION, SOLUTION INTRAVENOUS at 04:54

## 2023-09-06 RX ADMIN — BISACODYL 10 MG: 10 SUPPOSITORY RECTAL at 20:26

## 2023-09-06 RX ADMIN — OXYCODONE HYDROCHLORIDE 5 MG: 5 TABLET ORAL at 08:23

## 2023-09-06 RX ADMIN — ANTACID TABLETS 500 MG: 500 TABLET, CHEWABLE ORAL at 04:46

## 2023-09-06 RX ADMIN — HYDROMORPHONE HYDROCHLORIDE 0.5 MG: 1 INJECTION, SOLUTION INTRAMUSCULAR; INTRAVENOUS; SUBCUTANEOUS at 20:20

## 2023-09-06 RX ADMIN — SENNOSIDES AND DOCUSATE SODIUM 1 TABLET: 50; 8.6 TABLET ORAL at 20:25

## 2023-09-06 RX ADMIN — POLYETHYLENE GLYCOL 3350 1 PACKET: 17 POWDER, FOR SOLUTION ORAL at 04:47

## 2023-09-06 RX ADMIN — CEFAZOLIN 2 G: 2 INJECTION, POWDER, FOR SOLUTION INTRAMUSCULAR; INTRAVENOUS at 04:56

## 2023-09-06 RX ADMIN — MAGNESIUM HYDROXIDE 30 ML: 400 SUSPENSION ORAL at 11:32

## 2023-09-06 RX ADMIN — DOCUSATE SODIUM 100 MG: 100 CAPSULE, LIQUID FILLED ORAL at 04:45

## 2023-09-06 RX ADMIN — METHOCARBAMOL 750 MG: 750 TABLET ORAL at 06:16

## 2023-09-06 ASSESSMENT — PAIN DESCRIPTION - PAIN TYPE
TYPE: ACUTE PAIN;SURGICAL PAIN

## 2023-09-06 NOTE — THERAPY
Physical Therapy Contact Note    Patient Name: Blanca Alexander  Age:  58 y.o., Sex:  female  Medical Record #: [id number]  Today's Date: 9/6/2023 09/06/23 0822   Interdisciplinary Plan of Care Collaboration   Collaboration Comments Orders received, chart reviwed. Will wait to do eval after pt has stage 2 surgery.   Session Information   Date / Session Number  9/6 hold (eval)

## 2023-09-06 NOTE — CARE PLAN
The patient is Stable - Low risk of patient condition declining or worsening      Problem: Pain - Standard  Goal: Alleviation of pain or a reduction in pain to the patient’s comfort goal  Outcome: Progressing  Pain managed well at this time.  Will implement appropriate interventions PRN.      Problem: Knowledge Deficit - Standard  Goal: Patient and family/care givers will demonstrate understanding of plan of care, disease process/condition, diagnostic tests and medications  Outcome: Progressing  Patient updated on POC. All questions answered.

## 2023-09-06 NOTE — PROGRESS NOTES
Neurosurgery Progress Note    Subjective:  POD# 1 Stage I Left L3/4 XLIF.   Plan for Stage II L4 HWR, L3/4 redo laminotomy and L3/4 PSF tomorrow.     Mild back pain.  Improvements in pre-op radicular pain.   No new lower extremity symptoms, denied XLIF symptoms to left thigh.   Has ambulated.   Voiding.   Brace at bedside.     Exam:  Dressing CDI.    Motor 5/5 in bilateral lower extremities.   Sensation intact.       BP  Min: 91/64  Max: 140/79  Pulse  Av.2  Min: 48  Max: 94  Resp  Avg: 15.9  Min: 15  Max: 17  Temp  Av.4 °C (97.6 °F)  Min: 36.2 °C (97.2 °F)  Max: 36.8 °C (98.2 °F)  Monitored Temp 2  Av.2 °C (97.2 °F)  Min: 36.2 °C (97.2 °F)  Max: 36.2 °C (97.2 °F)  SpO2  Av.9 %  Min: 94 %  Max: 99 %    No data recorded    Recent Labs     23  0349   WBC 8.0   RBC 4.16*   HEMOGLOBIN 12.0   HEMATOCRIT 37.1   MCV 89.2   MCH 28.8   MCHC 32.3   RDW 49.3   PLATELETCT 211   MPV 10.4     Recent Labs     23  0349   SODIUM 140   POTASSIUM 5.1   CHLORIDE 106   CO2 26   GLUCOSE 148*   BUN 13   CREATININE 0.66   CALCIUM 9.2               Intake/Output                         23 07 - 2359 23 - 23 59      Total [id number] Total                 Intake    I.V.  690.6  -- 690.6  --  -- --    Phenylephrine Volume 0 -- 0 -- -- --    Propofol Volume 40.6 -- 40.6 -- -- --    Volume (mL) (lactated ringers infusion) 650 -- 650 -- -- --    Total Intake 690.6 -- 690.6 -- -- --       Output    Urine  --  -- --  --  -- --    Number of Times Voided 2 x -- 2 x -- -- --    Stool  --  -- --  --  -- --    Number of Times Stooled 1 x -- 1 x -- -- --    Blood  30  -- 30  --  -- --    Est. Blood Loss 30 -- 30 -- -- --    Total Output 30 -- 30 -- -- --       Net I/O     660.6 -- 660.6 -- -- --              Intake/Output Summary (Last 24 hours) at 2023 1049  Last data filed at 2023 1337  Gross per 24 hour   Intake 690.57 ml   Output 30 ml   Net 660.57  ml             enoxaparin (LOVENOX) injection  40 mg Once    Pharmacy Consult Request  1 Each PHARMACY TO DOSE    MD TIMOTHY...DO NOT ADMINISTER NSAIDS or ASPIRIN unless ORDERED By Neurosurgery  1 Each PRN    docusate sodium  100 mg BID    senna-docusate  1 Tablet Nightly    senna-docusate  1 Tablet Q24HRS PRN    polyethylene glycol/lytes  1 Packet BID PRN    magnesium hydroxide  30 mL QDAY PRN    bisacodyl  10 mg Q24HRS PRN    sodium phosphate  1 Each Once PRN    NS   Continuous    [Held by provider] enoxaparin (LOVENOX) injection  40 mg DAILY AT 1800    diphenhydrAMINE  25 mg Q6HRS PRN    Or    diphenhydrAMINE  25 mg Q6HRS PRN    ondansetron  4 mg Q4HRS PRN    ondansetron  4 mg Q4HRS PRN    promethazine  12.5-25 mg Q4HRS PRN    promethazine  12.5-25 mg Q4HRS PRN    prochlorperazine  5-10 mg Q4HRS PRN    methocarbamol  750 mg Q8HRS PRN    ALPRAZolam  0.25 mg BID PRN    labetalol  10 mg Q HOUR PRN    benzocaine-menthol  1 Lozenge Q2HRS PRN    calcium carbonate  500 mg BID    HYDROcodone/acetaminophen  1 Tablet Q4HRS PRN    oxyCODONE immediate-release  5 mg Q4HRS PRN    HYDROmorphone  0.5 mg Q3HRS PRN       Assessment and Plan:  Hospital day # 2  POD# 1  Plan for Stage II L4 HWR, L3/4 redo laminotomy and L3/4 PSF tomorrow.   PT/OT, encourage OOB. Brace when OOB > 5 min.   Ice incision.   VS and labs reviewed.   NPO tonight.   CT pending, ordered.   Questions answered for tomorrow.     Chemical prophylactic DVT therapy: Yes - Lovenox 40mg/qd    Start date/time: one dose today

## 2023-09-06 NOTE — CARE PLAN
Problem: Pain - Standard  Goal: Alleviation of pain or a reduction in pain to the patient’s comfort goal  Outcome: Progressing     Problem: Lumbar/Thoracic Spine Surgery  Goal: Post-Operative Lumbar/Thoracic Spine Surgery: Patient will achieve optimal post-surgical outcomes  Outcome: Progressing     Problem: Neuro Status  Goal: Neuro status will remain stable or improve  Outcome: Progressing   The patient is Stable - Low risk of patient condition declining or worsening    Shift Goals  Clinical Goals: Mobility, pain control, Q 4N checks  Patient Goals: pain control and rest  Family Goals: N/A    Progress made toward(s) clinical / shift goals:      Patient is not progressing towards the following goals:

## 2023-09-07 ENCOUNTER — APPOINTMENT (OUTPATIENT)
Dept: RADIOLOGY | Facility: MEDICAL CENTER | Age: 58
DRG: 455 | End: 2023-09-07
Attending: NEUROLOGICAL SURGERY
Payer: COMMERCIAL

## 2023-09-07 ENCOUNTER — ANESTHESIA EVENT (OUTPATIENT)
Dept: SURGERY | Facility: MEDICAL CENTER | Age: 58
DRG: 455 | End: 2023-09-07
Payer: COMMERCIAL

## 2023-09-07 ENCOUNTER — ANESTHESIA (OUTPATIENT)
Dept: SURGERY | Facility: MEDICAL CENTER | Age: 58
DRG: 455 | End: 2023-09-07
Payer: COMMERCIAL

## 2023-09-07 LAB
ANION GAP SERPL CALC-SCNC: 9 MMOL/L (ref 7–16)
BUN SERPL-MCNC: 12 MG/DL (ref 8–22)
CALCIUM SERPL-MCNC: 9.6 MG/DL (ref 8.5–10.5)
CHLORIDE SERPL-SCNC: 101 MMOL/L (ref 96–112)
CO2 SERPL-SCNC: 27 MMOL/L (ref 20–33)
CREAT SERPL-MCNC: 0.59 MG/DL (ref 0.5–1.4)
ERYTHROCYTE [DISTWIDTH] IN BLOOD BY AUTOMATED COUNT: 50.3 FL (ref 35.9–50)
GFR SERPLBLD CREATININE-BSD FMLA CKD-EPI: 104 ML/MIN/1.73 M 2
GLUCOSE SERPL-MCNC: 118 MG/DL (ref 65–99)
HCT VFR BLD AUTO: 37.5 % (ref 37–47)
HGB BLD-MCNC: 12.2 G/DL (ref 12–16)
MCH RBC QN AUTO: 29 PG (ref 27–33)
MCHC RBC AUTO-ENTMCNC: 32.5 G/DL (ref 32.2–35.5)
MCV RBC AUTO: 89.1 FL (ref 81.4–97.8)
PLATELET # BLD AUTO: 218 K/UL (ref 164–446)
PMV BLD AUTO: 10.3 FL (ref 9–12.9)
POTASSIUM SERPL-SCNC: 4 MMOL/L (ref 3.6–5.5)
RBC # BLD AUTO: 4.21 M/UL (ref 4.2–5.4)
SODIUM SERPL-SCNC: 137 MMOL/L (ref 135–145)
WBC # BLD AUTO: 8.9 K/UL (ref 4.8–10.8)

## 2023-09-07 PROCEDURE — 700101 HCHG RX REV CODE 250: Performed by: ANESTHESIOLOGY

## 2023-09-07 PROCEDURE — 700101 HCHG RX REV CODE 250: Performed by: NEUROLOGICAL SURGERY

## 2023-09-07 PROCEDURE — 110371 HCHG SHELL REV 272: Performed by: NEUROLOGICAL SURGERY

## 2023-09-07 PROCEDURE — 502000 HCHG MISC OR IMPLANTS RC 0278: Performed by: NEUROLOGICAL SURGERY

## 2023-09-07 PROCEDURE — 80048 BASIC METABOLIC PNL TOTAL CA: CPT

## 2023-09-07 PROCEDURE — 0QP004Z REMOVAL OF INTERNAL FIXATION DEVICE FROM LUMBAR VERTEBRA, OPEN APPROACH: ICD-10-PCS | Performed by: NEUROLOGICAL SURGERY

## 2023-09-07 PROCEDURE — 36415 COLL VENOUS BLD VENIPUNCTURE: CPT

## 2023-09-07 PROCEDURE — 95955 EEG DURING SURGERY: CPT | Performed by: NEUROLOGICAL SURGERY

## 2023-09-07 PROCEDURE — 700111 HCHG RX REV CODE 636 W/ 250 OVERRIDE (IP): Performed by: ANESTHESIOLOGY

## 2023-09-07 PROCEDURE — 95940 IONM IN OPERATNG ROOM 15 MIN: CPT | Performed by: NEUROLOGICAL SURGERY

## 2023-09-07 PROCEDURE — 95937 NEUROMUSCULAR JUNCTION TEST: CPT | Performed by: NEUROLOGICAL SURGERY

## 2023-09-07 PROCEDURE — 160031 HCHG SURGERY MINUTES - 1ST 30 MINS LEVEL 5: Performed by: NEUROLOGICAL SURGERY

## 2023-09-07 PROCEDURE — 502240 HCHG MISC OR SUPPLY RC 0272: Performed by: NEUROLOGICAL SURGERY

## 2023-09-07 PROCEDURE — 85027 COMPLETE CBC AUTOMATED: CPT

## 2023-09-07 PROCEDURE — C1713 ANCHOR/SCREW BN/BN,TIS/BN: HCPCS | Performed by: NEUROLOGICAL SURGERY

## 2023-09-07 PROCEDURE — C1755 CATHETER, INTRASPINAL: HCPCS | Performed by: NEUROLOGICAL SURGERY

## 2023-09-07 PROCEDURE — 700102 HCHG RX REV CODE 250 W/ 637 OVERRIDE(OP): Performed by: PHYSICIAN ASSISTANT

## 2023-09-07 PROCEDURE — 160009 HCHG ANES TIME/MIN: Performed by: NEUROLOGICAL SURGERY

## 2023-09-07 PROCEDURE — 0SG1071 FUSION OF 2 OR MORE LUMBAR VERTEBRAL JOINTS WITH AUTOLOGOUS TISSUE SUBSTITUTE, POSTERIOR APPROACH, POSTERIOR COLUMN, OPEN APPROACH: ICD-10-PCS | Performed by: NEUROLOGICAL SURGERY

## 2023-09-07 PROCEDURE — A9270 NON-COVERED ITEM OR SERVICE: HCPCS | Performed by: ANESTHESIOLOGY

## 2023-09-07 PROCEDURE — 95938 SOMATOSENSORY TESTING: CPT | Performed by: NEUROLOGICAL SURGERY

## 2023-09-07 PROCEDURE — 700102 HCHG RX REV CODE 250 W/ 637 OVERRIDE(OP): Performed by: ANESTHESIOLOGY

## 2023-09-07 PROCEDURE — 700111 HCHG RX REV CODE 636 W/ 250 OVERRIDE (IP): Performed by: PHYSICIAN ASSISTANT

## 2023-09-07 PROCEDURE — 8E0WXBZ COMPUTER ASSISTED PROCEDURE OF TRUNK REGION: ICD-10-PCS | Performed by: NEUROLOGICAL SURGERY

## 2023-09-07 PROCEDURE — 95861 NEEDLE EMG 2 EXTREMITIES: CPT | Performed by: NEUROLOGICAL SURGERY

## 2023-09-07 PROCEDURE — 700105 HCHG RX REV CODE 258: Performed by: ANESTHESIOLOGY

## 2023-09-07 PROCEDURE — 160048 HCHG OR STATISTICAL LEVEL 1-5: Performed by: NEUROLOGICAL SURGERY

## 2023-09-07 PROCEDURE — A9270 NON-COVERED ITEM OR SERVICE: HCPCS | Performed by: PHYSICIAN ASSISTANT

## 2023-09-07 PROCEDURE — 770001 HCHG ROOM/CARE - MED/SURG/GYN PRIV*

## 2023-09-07 PROCEDURE — 95907 NVR CNDJ TST 1-2 STUDIES: CPT | Performed by: NEUROLOGICAL SURGERY

## 2023-09-07 PROCEDURE — 160002 HCHG RECOVERY MINUTES (STAT): Performed by: NEUROLOGICAL SURGERY

## 2023-09-07 PROCEDURE — 160042 HCHG SURGERY MINUTES - EA ADDL 1 MIN LEVEL 5: Performed by: NEUROLOGICAL SURGERY

## 2023-09-07 PROCEDURE — 110454 HCHG SHELL REV 250: Performed by: NEUROLOGICAL SURGERY

## 2023-09-07 PROCEDURE — 700111 HCHG RX REV CODE 636 W/ 250 OVERRIDE (IP): Mod: JZ | Performed by: NEUROLOGICAL SURGERY

## 2023-09-07 PROCEDURE — 72100 X-RAY EXAM L-S SPINE 2/3 VWS: CPT

## 2023-09-07 PROCEDURE — 160035 HCHG PACU - 1ST 60 MINS PHASE I: Performed by: NEUROLOGICAL SURGERY

## 2023-09-07 PROCEDURE — 700105 HCHG RX REV CODE 258: Performed by: PHYSICIAN ASSISTANT

## 2023-09-07 DEVICE — SET SCREW POINTLOCK (1EA): Type: IMPLANTABLE DEVICE | Site: BACK | Status: FUNCTIONAL

## 2023-09-07 DEVICE — IMPLANTABLE DEVICE: Type: IMPLANTABLE DEVICE | Site: BACK | Status: FUNCTIONAL

## 2023-09-07 RX ORDER — BUPIVACAINE HYDROCHLORIDE AND EPINEPHRINE 5; 5 MG/ML; UG/ML
INJECTION, SOLUTION PERINEURAL
Status: DISCONTINUED | OUTPATIENT
Start: 2023-09-07 | End: 2023-09-07 | Stop reason: HOSPADM

## 2023-09-07 RX ORDER — KETOROLAC TROMETHAMINE 30 MG/ML
15 INJECTION, SOLUTION INTRAMUSCULAR; INTRAVENOUS EVERY 8 HOURS PRN
Status: DISCONTINUED | OUTPATIENT
Start: 2023-09-07 | End: 2023-09-08

## 2023-09-07 RX ORDER — ONDANSETRON 2 MG/ML
4 INJECTION INTRAMUSCULAR; INTRAVENOUS
Status: DISCONTINUED | OUTPATIENT
Start: 2023-09-07 | End: 2023-09-07 | Stop reason: HOSPADM

## 2023-09-07 RX ORDER — DEXAMETHASONE SODIUM PHOSPHATE 4 MG/ML
INJECTION, SOLUTION INTRA-ARTICULAR; INTRALESIONAL; INTRAMUSCULAR; INTRAVENOUS; SOFT TISSUE PRN
Status: DISCONTINUED | OUTPATIENT
Start: 2023-09-07 | End: 2023-09-07 | Stop reason: SURG

## 2023-09-07 RX ORDER — SODIUM CHLORIDE, SODIUM LACTATE, POTASSIUM CHLORIDE, CALCIUM CHLORIDE 600; 310; 30; 20 MG/100ML; MG/100ML; MG/100ML; MG/100ML
INJECTION, SOLUTION INTRAVENOUS CONTINUOUS
Status: DISCONTINUED | OUTPATIENT
Start: 2023-09-07 | End: 2023-09-07 | Stop reason: HOSPADM

## 2023-09-07 RX ORDER — LIDOCAINE HYDROCHLORIDE 20 MG/ML
INJECTION, SOLUTION EPIDURAL; INFILTRATION; INTRACAUDAL; PERINEURAL PRN
Status: DISCONTINUED | OUTPATIENT
Start: 2023-09-07 | End: 2023-09-07 | Stop reason: SURG

## 2023-09-07 RX ORDER — EPHEDRINE SULFATE 50 MG/ML
5 INJECTION, SOLUTION INTRAVENOUS
Status: DISCONTINUED | OUTPATIENT
Start: 2023-09-07 | End: 2023-09-07 | Stop reason: HOSPADM

## 2023-09-07 RX ORDER — ROCURONIUM BROMIDE 10 MG/ML
INJECTION, SOLUTION INTRAVENOUS PRN
Status: DISCONTINUED | OUTPATIENT
Start: 2023-09-07 | End: 2023-09-07 | Stop reason: SURG

## 2023-09-07 RX ORDER — ONDANSETRON 2 MG/ML
INJECTION INTRAMUSCULAR; INTRAVENOUS PRN
Status: DISCONTINUED | OUTPATIENT
Start: 2023-09-07 | End: 2023-09-07 | Stop reason: SURG

## 2023-09-07 RX ORDER — DIPHENHYDRAMINE HYDROCHLORIDE 50 MG/ML
12.5 INJECTION INTRAMUSCULAR; INTRAVENOUS
Status: DISCONTINUED | OUTPATIENT
Start: 2023-09-07 | End: 2023-09-07 | Stop reason: HOSPADM

## 2023-09-07 RX ORDER — OXYCODONE HCL 5 MG/5 ML
10 SOLUTION, ORAL ORAL
Status: COMPLETED | OUTPATIENT
Start: 2023-09-07 | End: 2023-09-07

## 2023-09-07 RX ORDER — HYDRALAZINE HYDROCHLORIDE 20 MG/ML
5 INJECTION INTRAMUSCULAR; INTRAVENOUS
Status: DISCONTINUED | OUTPATIENT
Start: 2023-09-07 | End: 2023-09-07 | Stop reason: HOSPADM

## 2023-09-07 RX ORDER — PHENYLEPHRINE HCL IN 0.9% NACL 0.5 MG/5ML
SYRINGE (ML) INTRAVENOUS PRN
Status: DISCONTINUED | OUTPATIENT
Start: 2023-09-07 | End: 2023-09-07 | Stop reason: SURG

## 2023-09-07 RX ORDER — OXYCODONE HCL 5 MG/5 ML
5 SOLUTION, ORAL ORAL
Status: COMPLETED | OUTPATIENT
Start: 2023-09-07 | End: 2023-09-07

## 2023-09-07 RX ORDER — CEFAZOLIN SODIUM 1 G/3ML
INJECTION, POWDER, FOR SOLUTION INTRAMUSCULAR; INTRAVENOUS PRN
Status: DISCONTINUED | OUTPATIENT
Start: 2023-09-07 | End: 2023-09-07 | Stop reason: SURG

## 2023-09-07 RX ORDER — EPHEDRINE SULFATE 50 MG/ML
INJECTION, SOLUTION INTRAVENOUS PRN
Status: DISCONTINUED | OUTPATIENT
Start: 2023-09-07 | End: 2023-09-07 | Stop reason: SURG

## 2023-09-07 RX ORDER — ENOXAPARIN SODIUM 100 MG/ML
40 INJECTION SUBCUTANEOUS
Status: DISCONTINUED | OUTPATIENT
Start: 2023-09-08 | End: 2023-09-09 | Stop reason: HOSPADM

## 2023-09-07 RX ORDER — SODIUM CHLORIDE, SODIUM LACTATE, POTASSIUM CHLORIDE, CALCIUM CHLORIDE 600; 310; 30; 20 MG/100ML; MG/100ML; MG/100ML; MG/100ML
INJECTION, SOLUTION INTRAVENOUS
Status: DISCONTINUED | OUTPATIENT
Start: 2023-09-07 | End: 2023-09-07 | Stop reason: SURG

## 2023-09-07 RX ORDER — HYDROMORPHONE HYDROCHLORIDE 1 MG/ML
0.4 INJECTION, SOLUTION INTRAMUSCULAR; INTRAVENOUS; SUBCUTANEOUS
Status: DISCONTINUED | OUTPATIENT
Start: 2023-09-07 | End: 2023-09-07 | Stop reason: HOSPADM

## 2023-09-07 RX ORDER — METHOCARBAMOL 750 MG/1
750 TABLET, FILM COATED ORAL EVERY 6 HOURS PRN
Status: DISCONTINUED | OUTPATIENT
Start: 2023-09-07 | End: 2023-09-09 | Stop reason: HOSPADM

## 2023-09-07 RX ORDER — POLYETHYLENE GLYCOL 3350 17 G/17G
1 POWDER, FOR SOLUTION ORAL DAILY
Status: DISCONTINUED | OUTPATIENT
Start: 2023-09-08 | End: 2023-09-09 | Stop reason: HOSPADM

## 2023-09-07 RX ORDER — MIDAZOLAM HYDROCHLORIDE 1 MG/ML
INJECTION INTRAMUSCULAR; INTRAVENOUS PRN
Status: DISCONTINUED | OUTPATIENT
Start: 2023-09-07 | End: 2023-09-07 | Stop reason: SURG

## 2023-09-07 RX ORDER — VANCOMYCIN HYDROCHLORIDE 1 G/20ML
INJECTION, POWDER, LYOPHILIZED, FOR SOLUTION INTRAVENOUS
Status: COMPLETED | OUTPATIENT
Start: 2023-09-07 | End: 2023-09-07

## 2023-09-07 RX ORDER — LABETALOL HYDROCHLORIDE 5 MG/ML
5 INJECTION, SOLUTION INTRAVENOUS
Status: DISCONTINUED | OUTPATIENT
Start: 2023-09-07 | End: 2023-09-07 | Stop reason: HOSPADM

## 2023-09-07 RX ORDER — HYDROMORPHONE HYDROCHLORIDE 1 MG/ML
0.75 INJECTION, SOLUTION INTRAMUSCULAR; INTRAVENOUS; SUBCUTANEOUS
Status: DISCONTINUED | OUTPATIENT
Start: 2023-09-07 | End: 2023-09-09 | Stop reason: HOSPADM

## 2023-09-07 RX ORDER — LIDOCAINE HYDROCHLORIDE 40 MG/ML
SOLUTION TOPICAL PRN
Status: DISCONTINUED | OUTPATIENT
Start: 2023-09-07 | End: 2023-09-07 | Stop reason: SURG

## 2023-09-07 RX ORDER — HYDROMORPHONE HYDROCHLORIDE 1 MG/ML
0.2 INJECTION, SOLUTION INTRAMUSCULAR; INTRAVENOUS; SUBCUTANEOUS
Status: DISCONTINUED | OUTPATIENT
Start: 2023-09-07 | End: 2023-09-07 | Stop reason: HOSPADM

## 2023-09-07 RX ORDER — CEFAZOLIN SODIUM 1 G/3ML
INJECTION, POWDER, FOR SOLUTION INTRAMUSCULAR; INTRAVENOUS
Status: DISCONTINUED | OUTPATIENT
Start: 2023-09-07 | End: 2023-09-07 | Stop reason: HOSPADM

## 2023-09-07 RX ORDER — HALOPERIDOL 5 MG/ML
1 INJECTION INTRAMUSCULAR
Status: DISCONTINUED | OUTPATIENT
Start: 2023-09-07 | End: 2023-09-07 | Stop reason: HOSPADM

## 2023-09-07 RX ORDER — BUPIVACAINE HYDROCHLORIDE 2.5 MG/ML
INJECTION, SOLUTION EPIDURAL; INFILTRATION; INTRACAUDAL
Status: DISCONTINUED | OUTPATIENT
Start: 2023-09-07 | End: 2023-09-07 | Stop reason: HOSPADM

## 2023-09-07 RX ORDER — HYDROMORPHONE HYDROCHLORIDE 1 MG/ML
0.1 INJECTION, SOLUTION INTRAMUSCULAR; INTRAVENOUS; SUBCUTANEOUS
Status: DISCONTINUED | OUTPATIENT
Start: 2023-09-07 | End: 2023-09-07 | Stop reason: HOSPADM

## 2023-09-07 RX ADMIN — HYDROCODONE BITARTRATE AND ACETAMINOPHEN 1 TABLET: 10; 325 TABLET ORAL at 04:45

## 2023-09-07 RX ADMIN — SUGAMMADEX 200 MG: 100 INJECTION, SOLUTION INTRAVENOUS at 14:35

## 2023-09-07 RX ADMIN — FENTANYL CITRATE 100 MCG: 50 INJECTION, SOLUTION INTRAMUSCULAR; INTRAVENOUS at 14:18

## 2023-09-07 RX ADMIN — SODIUM CHLORIDE, POTASSIUM CHLORIDE, SODIUM LACTATE AND CALCIUM CHLORIDE: 600; 310; 30; 20 INJECTION, SOLUTION INTRAVENOUS at 14:11

## 2023-09-07 RX ADMIN — ONDANSETRON 4 MG: 2 INJECTION INTRAMUSCULAR; INTRAVENOUS at 15:54

## 2023-09-07 RX ADMIN — PROPOFOL 125 MCG/KG/MIN: 10 INJECTION, EMULSION INTRAVENOUS at 14:30

## 2023-09-07 RX ADMIN — Medication 100 MCG: at 15:03

## 2023-09-07 RX ADMIN — Medication 100 MCG: at 14:27

## 2023-09-07 RX ADMIN — MIDAZOLAM 2 MG: 1 INJECTION, SOLUTION INTRAMUSCULAR; INTRAVENOUS at 14:16

## 2023-09-07 RX ADMIN — EPHEDRINE SULFATE 5 MG: 50 INJECTION, SOLUTION INTRAVENOUS at 15:05

## 2023-09-07 RX ADMIN — ANTACID TABLETS 500 MG: 500 TABLET, CHEWABLE ORAL at 04:44

## 2023-09-07 RX ADMIN — LIDOCAINE HYDROCHLORIDE 2 ML: 20 INJECTION, SOLUTION EPIDURAL; INFILTRATION; INTRACAUDAL at 14:20

## 2023-09-07 RX ADMIN — HYDROMORPHONE HYDROCHLORIDE 0.4 MG: 1 INJECTION, SOLUTION INTRAMUSCULAR; INTRAVENOUS; SUBCUTANEOUS at 17:17

## 2023-09-07 RX ADMIN — DOCUSATE SODIUM 100 MG: 100 CAPSULE, LIQUID FILLED ORAL at 04:44

## 2023-09-07 RX ADMIN — PROPOFOL 120 MG: 10 INJECTION, EMULSION INTRAVENOUS at 14:20

## 2023-09-07 RX ADMIN — OXYCODONE HYDROCHLORIDE 10 MG: 5 SOLUTION ORAL at 16:27

## 2023-09-07 RX ADMIN — DEXAMETHASONE SODIUM PHOSPHATE 8 MG: 4 INJECTION INTRA-ARTICULAR; INTRALESIONAL; INTRAMUSCULAR; INTRAVENOUS; SOFT TISSUE at 14:35

## 2023-09-07 RX ADMIN — EPHEDRINE SULFATE 5 MG: 50 INJECTION, SOLUTION INTRAVENOUS at 14:49

## 2023-09-07 RX ADMIN — FENTANYL CITRATE 50 MCG: 50 INJECTION, SOLUTION INTRAMUSCULAR; INTRAVENOUS at 16:40

## 2023-09-07 RX ADMIN — SODIUM CHLORIDE, POTASSIUM CHLORIDE, SODIUM LACTATE AND CALCIUM CHLORIDE: 600; 310; 30; 20 INJECTION, SOLUTION INTRAVENOUS at 15:24

## 2023-09-07 RX ADMIN — FENTANYL CITRATE 50 MCG: 50 INJECTION, SOLUTION INTRAMUSCULAR; INTRAVENOUS at 15:13

## 2023-09-07 RX ADMIN — ROCURONIUM BROMIDE 40 MG: 50 INJECTION, SOLUTION INTRAVENOUS at 14:20

## 2023-09-07 RX ADMIN — FENTANYL CITRATE 50 MCG: 50 INJECTION, SOLUTION INTRAMUSCULAR; INTRAVENOUS at 16:28

## 2023-09-07 RX ADMIN — CEFAZOLIN 2 G: 1 INJECTION, POWDER, FOR SOLUTION INTRAMUSCULAR; INTRAVENOUS at 14:24

## 2023-09-07 RX ADMIN — METHOCARBAMOL 1000 MG: 100 INJECTION, SOLUTION INTRAMUSCULAR; INTRAVENOUS at 16:47

## 2023-09-07 RX ADMIN — FENTANYL CITRATE 50 MCG: 50 INJECTION, SOLUTION INTRAMUSCULAR; INTRAVENOUS at 15:56

## 2023-09-07 RX ADMIN — Medication 100 MCG: at 14:23

## 2023-09-07 RX ADMIN — LIDOCAINE HYDROCHLORIDE 4 ML: 40 SOLUTION TOPICAL at 14:21

## 2023-09-07 RX ADMIN — SENNOSIDES AND DOCUSATE SODIUM 1 TABLET: 50; 8.6 TABLET ORAL at 22:11

## 2023-09-07 RX ADMIN — Medication 100 MCG: at 14:47

## 2023-09-07 ASSESSMENT — PAIN DESCRIPTION - PAIN TYPE
TYPE: ACUTE PAIN;SURGICAL PAIN
TYPE: SURGICAL PAIN
TYPE: ACUTE PAIN;SURGICAL PAIN

## 2023-09-07 NOTE — ANESTHESIA TIME REPORT
Anesthesia Start and Stop Event Times     Date Time Event    9/7/2023 1351 Ready for Procedure     1411 Anesthesia Start     1613 Anesthesia Stop        Responsible Staff  09/07/23    Name Role Begin End    Adrien Mcdowell M.D. Anesth 1411 1613        Overtime Reason:  no overtime (within assigned shift)    Comments:

## 2023-09-07 NOTE — PROGRESS NOTES
Neurosurgery Progress Note    Subjective:  POD#2 Stage I Left L3/4 XLIF.   POD#0 Stage II L4 HWR, L3/4 redo laminotomy and L3/4 PSF.     Mild back pain.  Improvements in pre-op radicular pain.   No new lower extremity symptoms, denied XLIF symptoms to left thigh.   Has ambulated.   Voiding, +BM.  Brace at bedside.     Lumbar CT completed.     Exam:  Dressing CDI.    Motor 5/5 in bilateral lower extremities.   Sensation intact.       BP  Min: 97/63  Max: 132/84  Pulse  Av.8  Min: 68  Max: 84  Resp  Av  Min: 17  Max: 17  Temp  Av.9 °C (98.5 °F)  Min: 36.4 °C (97.5 °F)  Max: 37.2 °C (99 °F)  Monitored Temp 2  Av.7 °C (98.1 °F)  Min: 36.4 °C (97.5 °F)  Max: 37 °C (98.6 °F)  SpO2  Av.8 %  Min: 90 %  Max: 93 %    No data recorded    Recent Labs     23  0349 23  0356   WBC 8.0 8.9   RBC 4.16* 4.21   HEMOGLOBIN 12.0 12.2   HEMATOCRIT 37.1 37.5   MCV 89.2 89.1   MCH 28.8 29.0   MCHC 32.3 32.5   RDW 49.3 50.3*   PLATELETCT 211 218   MPV 10.4 10.3       Recent Labs     23  0356   SODIUM 140 137   POTASSIUM 5.1 4.0   CHLORIDE 106 101   CO2 26 27   GLUCOSE 148* 118*   BUN 13 12   CREATININE 0.66 0.59   CALCIUM 9.2 9.6                 Intake/Output                         23 - 23 - 23 Total  Total                 Intake    Total Intake -- -- -- -- -- --       Output    Urine  --  -- --  --  -- --    Number of Times Voided 4 x -- 4 x -- -- --    Stool  --  -- --  --  -- --    Number of Times Stooled -- 1 x 1 x -- -- --    Total Output -- -- -- -- -- --       Net I/O     -- -- -- -- -- --            No intake or output data in the 24 hours ending 23 0900            Pharmacy Consult Request  1 Each PHARMACY TO DOSE    MD ALERT...DO NOT ADMINISTER NSAIDS or ASPIRIN unless ORDERED By Neurosurgery  1 Each PRN    docusate sodium  100 mg BID    senna-docusate  1 Tablet Nightly     senna-docusate  1 Tablet Q24HRS PRN    polyethylene glycol/lytes  1 Packet BID PRN    magnesium hydroxide  30 mL QDAY PRN    bisacodyl  10 mg Q24HRS PRN    sodium phosphate  1 Each Once PRN    NS   Continuous    [Held by provider] enoxaparin (LOVENOX) injection  40 mg DAILY AT 1800    diphenhydrAMINE  25 mg Q6HRS PRN    Or    diphenhydrAMINE  25 mg Q6HRS PRN    ondansetron  4 mg Q4HRS PRN    ondansetron  4 mg Q4HRS PRN    promethazine  12.5-25 mg Q4HRS PRN    promethazine  12.5-25 mg Q4HRS PRN    prochlorperazine  5-10 mg Q4HRS PRN    methocarbamol  750 mg Q8HRS PRN    ALPRAZolam  0.25 mg BID PRN    labetalol  10 mg Q HOUR PRN    benzocaine-menthol  1 Lozenge Q2HRS PRN    calcium carbonate  500 mg BID    HYDROcodone/acetaminophen  1 Tablet Q4HRS PRN    oxyCODONE immediate-release  5 mg Q4HRS PRN    HYDROmorphone  0.5 mg Q3HRS PRN       Assessment and Plan:  Hospital day # 3  POD#2/0  Plan for Stage II L4 HWR, L3/4 redo laminotomy and L3/4 PSF today.   PT/OT, encourage OOB. Brace when OOB > 5 min.   Ice incision.   VS and labs reviewed.   NPO now.   Questions answered, patient ready to proceed.     Chemical prophylactic DVT therapy: Yes - Lovenox 40mg/qd    Start date/time: hold 9/7, resume 9/8

## 2023-09-07 NOTE — ANESTHESIA PROCEDURE NOTES
Airway    Date/Time: 9/7/2023 2:21 PM    Performed by: Adrien Mcdowell M.D.  Authorized by: Adrien Mcdowell M.D.    Location:  OR  Urgency:  Elective  Difficult Airway: No    Indications for Airway Management:  Anesthesia      Spontaneous Ventilation: absent    Sedation Level:  Deep  Preoxygenated: Yes    Patient Position:  Sniffing  Mask Difficulty Assessment:  2 - vent by mask + OA or adjuvant +/- NMBA  Final Airway Type:  Endotracheal airway  Final Endotracheal Airway:  ETT  Cuffed: Yes    Technique Used for Successful ETT Placement:  Direct laryngoscopy    Insertion Site:  Oral  Blade Type:  Ryne  Laryngoscope Blade/Videolaryngoscope Blade Size:  3  ETT Size (mm):  7.0  Measured from:  Teeth  ETT to Teeth (cm):  21  Placement Verified by: auscultation and capnometry    Cormack-Lehane Classification:  Grade I - full view of glottis  Number of Attempts at Approach:  1   Atraumatic DLx1

## 2023-09-07 NOTE — CARE PLAN
Problem: Pain - Standard  Goal: Alleviation of pain or a reduction in pain to the patient’s comfort goal  Outcome: Progressing     Problem: Neuro Status  Goal: Neuro status will remain stable or improve  Outcome: Progressing   The patient is Stable - Low risk of patient condition declining or worsening    Shift Goals  Clinical Goals: BM,pain control  Patient Goals: BM, pain control  Family Goals: NA    Progress made toward(s) clinical / shift goals:  yes    Patient is not progressing towards the following goals:

## 2023-09-07 NOTE — ANESTHESIA PREPROCEDURE EVALUATION
Case: [id number] Date/Time: 09/07/23 1545    Procedures:       STAGE #2 - REMOVAL OF LUMBAR 4 SCREW, LUMBAR 3-4 REDO LAMINECTOMY LUMBAR 3-4 POSTERIOR SPINAL FUSION      LAMINECTOMY, SPINE, LUMBAR, WITH DISCECTOMY      REMOVAL, HARDWARE    Pre-op diagnosis: LUMBAR SPONDYLOLISTHESIS    Location: [facility] OR 07 / SURGERY [facility]    Surgeons: Odin Monzon M.D.      Patient here for stage 2 lumbar surgery.  Had stage 1 done on 9/5/2023.  Did well no complications.     Relevant Problems   No relevant active problems   DLD      Physical Exam    Airway   Mallampati: II  TM distance: >3 FB  Neck ROM: full       Cardiovascular - normal exam  Rhythm: regular  Rate: normal  (-) murmur     Dental - normal exam           Pulmonary - normal exam  Breath sounds clear to auscultation     Abdominal    Neurological - normal exam                 Anesthesia Plan    ASA 2       Plan - general       Airway plan will be ETT          Induction: intravenous    Postoperative Plan: Postoperative administration of opioids is intended.    Pertinent diagnostic labs and testing reviewed    Informed Consent:    Anesthetic plan and risks discussed with patient.    Use of blood products discussed with: patient whom consented to blood products.

## 2023-09-07 NOTE — OR SURGEON
Immediate Post OP Note    PreOp Diagnosis: L3/4 spondylosis, stenosis, spondylolisthesis, radiculopathy, low back pain       PostOp Diagnosis: same       Procedure(s):  STAGE #2 - removal of prior right L4/5 posterior hardware with  percutaneous L3/4 fusion- Wound Class: Clean      Surgeon(s):  Odin Monzon M.D.    Anesthesiologist/Type of Anesthesia:  Anesthesiologist: Adrien Mcdowell M.D./General    Surgical Staff:  Assistant: Jordi Rao P.A.-C.  Circulator: Michelle Albarado RGINNY.  Relief Circulator: Marquita Beasley R.N.; Jazmyne Lam R.N.  Scrub Person: Zuleika Helton  Radiology Technologist: Agnieszka Carbajal    Specimens removed if any:  * No specimens in log *    Estimated Blood Loss: <25cc    Findings: went well, see dictation     Complications: none        9/7/2023 4:11 PM Jordi Rao P.A.-C.

## 2023-09-07 NOTE — CARE PLAN
The patient is Stable - Low risk of patient condition declining or worsening      Problem: Pain - Standard  Goal: Alleviation of pain or a reduction in pain to the patient’s comfort goal  Outcome: Progressing  Pain managed well at this time.  Will implement appropriate interventions PRN.       Problem: Bowel Elimination  Goal: Establish and maintain regular bowel function  Outcome: Progressing  Patient had a BM 9/7

## 2023-09-07 NOTE — OP REPORT
DATE OF SERVICE:  09/05/2023     PREOPERATIVE DIAGNOSES:    1.  L3-L4 degenerative disk disease.  2.  L3-L4 radiculopathies.  3.  L3-L4 grade I spondylolisthesis.  4.  Low back pain refractory to medical care.     POSTOPERATIVE DIAGNOSES:    1.  L3-L4 degenerative disk disease.  2.  L3-L4 radiculopathies.  3.  L3-L4 grade I spondylolisthesis.  4.  Low back pain refractory to medical care.     PROCEDURES PERFORMED:  Stage 1 of a planned 2-stage procedure for anterior,   posterior, circumferential decompression and fusion in separate stages.     STAGE 1:  1.  Minimally invasive left L3-L4 XLIF.  2.  Partial corpectomy at L3.  3.  Partial corpectomy at L4.  4.  L3-L4 arthrodesis with SeaSpine titanium interbody cage, local morselized   autograft and OsteoStrand plus arthrodesis.  5.  Insertion of titanium cage at one level.  6.  L3-L4 lateral plating system with bicortical screws.  7.  SSEPs and EMG monitoring performed by neuromonitoring associates, which   remained stable throughout.     SURGEON:  Odin Monzon MD, Neurosurgery and Spine Surgery.     ASSISTANT:  Jeovanny Lazo PA-C     ANESTHESIA:  General endotracheal anesthesia.     ANESTHESIOLOGIST:  Danilo Morgan MD     COMPLICATIONS:  None.     ESTIMATED BLOOD LOSS:  Less than 20 mL     PREOPERATIVE NOTE:  This extremely pleasant 58-year-old lady presents with low   back pain, neurogenic claudication, all stemming from L3-L4 lumbar stenosis   above a prior L4-L5 TLIF performed elsewhere remotely.  The patient failed   conservative care.  I discussed surgical procedure, alternatives, goals,   risks, benefits, complications in detail with the patient.  The patient   understood and consented to surgery for above listed procedure.  The idea was   to perform stage procedure for L3-L4 indirect decompression with an XLIF to   avoid a redo decompression and hence the patient could simply have posterior   pedicle screw fixation unilaterally to avoid more  extensive redo decompression   if the patient was doing well after surgery.     PLAN:  I discussed surgical procedure, alternatives, goals, risks, benefits,   complications in detail with the patient.  The patient understood and   consented to surgery for above listed procedure.  Details well contained in   the office visit notes.     DESCRIPTION OF PROCEDURE:  The patient was brought to the operating room and   placed under general anesthesia.  She was placed in a true lateral position   with the left side up with great care taken about the bony prominences,   peripheral nerves.  The left flank region was prepped and draped in the usual   sterile fashion.  Following localization of cross table fluoroscopy, a small   incision made over the left L3-L4 level and I entered the left retroperitoneal   space, identified the psoas muscle and with continuous EMG monitoring, placed   the first dilator through the psoas muscle, mapping out the lumbar plexus   posteriorly.  Sequential dilation was achieved and the Lattus retractor system   was applied.  The blades opened up allowing excellent exposure at L3-L4   level.  The Intradiscal nikhil was placed at L3-L4, securing the retractor to   the vertebral bodies.  I placed the ALL retractor anteriorly.  I incised the   disk at L3-L4, removed the disk from the endplates.  Disk space was quite   arthrodesed, collapsed down, did not distract up well, however.  Hence,   partial corpectomy of L3 and a partial corpectomy of L4 was completed by   removing one-third of vertebral bodies.  I released the annulus, sequentially   distracted.  I then sized up for the appropriate lordotic SeaSpine cage.    Appropriate lordotic SeaSpine cage with 23 mm in depth and 16-degree lordosis   was chosen, packed with local morselized autograft with OsteoStrand plus   arthrodesis and delivered under distraction between the body of L3-L4 for an   excellent A plus fit.  This opened up the disk space well  and corrected the   spondylolisthesis.  A lateral plating was secured over L3-L4 bicortical screws   and locking mechanism engaged.  AP and lateral x-rays confirmed excellent   position of the cage and the plate with excellent restored lordosis,   correction of the spondylolisthesis and an excellent indirect decompression   achieved.  Wound was irrigated, hemostasis achieved.  I infiltrated the muscle   with local anesthetic and closed the wound with 0 Vicryl deep fascia, 2-0   Vicryl deep dermal layer, Steri-Strips to skin.  Small sterile dressing was   applied.  Swabs, needles, instruments correct by two count.  No complications   were encountered.  The patient tolerated the procedure, stable and transferred   to recovery room.  The patient will be observed at [facility] until she meets discharge criteria.  Stage 2 will take place in 2 days'   time with posterior right-sided L3-L4 instrumented stabilization with removal   of prior hardware with posterolateral arthrodesis.  The patient was doing   well in recovery and hence with excellent indirect decompression and hence a   posterior redo decompression is not mandatory at this stage and can be   avoided.  This completes stage 1 of a planned stage 2-stage procedure for   anterior, posterior, circumferential decompression and fusion, and the patient   will follow up at [facility] in 2 weeks and 6 weeks' time as   arranged.        ______________________________  MD DANIA BUCIO/SUZI/SAMAN    DD:  09/06/2023 15:26  DT:  09/06/2023 17:33    Job#:  [id number]    CC:Adrien Herndon DO

## 2023-09-08 LAB
ANION GAP SERPL CALC-SCNC: 8 MMOL/L (ref 7–16)
BUN SERPL-MCNC: 11 MG/DL (ref 8–22)
CALCIUM SERPL-MCNC: 9.4 MG/DL (ref 8.5–10.5)
CHLORIDE SERPL-SCNC: 102 MMOL/L (ref 96–112)
CO2 SERPL-SCNC: 27 MMOL/L (ref 20–33)
CREAT SERPL-MCNC: 0.58 MG/DL (ref 0.5–1.4)
ERYTHROCYTE [DISTWIDTH] IN BLOOD BY AUTOMATED COUNT: 51.1 FL (ref 35.9–50)
GFR SERPLBLD CREATININE-BSD FMLA CKD-EPI: 104 ML/MIN/1.73 M 2
GLUCOSE SERPL-MCNC: 112 MG/DL (ref 65–99)
HCT VFR BLD AUTO: 33.3 % (ref 37–47)
HGB BLD-MCNC: 10.9 G/DL (ref 12–16)
MCH RBC QN AUTO: 29 PG (ref 27–33)
MCHC RBC AUTO-ENTMCNC: 32.7 G/DL (ref 32.2–35.5)
MCV RBC AUTO: 88.6 FL (ref 81.4–97.8)
PLATELET # BLD AUTO: 187 K/UL (ref 164–446)
PMV BLD AUTO: 10.1 FL (ref 9–12.9)
POTASSIUM SERPL-SCNC: 4 MMOL/L (ref 3.6–5.5)
RBC # BLD AUTO: 3.76 M/UL (ref 4.2–5.4)
SODIUM SERPL-SCNC: 137 MMOL/L (ref 135–145)
WBC # BLD AUTO: 6.9 K/UL (ref 4.8–10.8)

## 2023-09-08 PROCEDURE — 700105 HCHG RX REV CODE 258: Performed by: PHYSICIAN ASSISTANT

## 2023-09-08 PROCEDURE — A9270 NON-COVERED ITEM OR SERVICE: HCPCS | Performed by: PHYSICIAN ASSISTANT

## 2023-09-08 PROCEDURE — 97535 SELF CARE MNGMENT TRAINING: CPT

## 2023-09-08 PROCEDURE — 85027 COMPLETE CBC AUTOMATED: CPT

## 2023-09-08 PROCEDURE — 97162 PT EVAL MOD COMPLEX 30 MIN: CPT

## 2023-09-08 PROCEDURE — 770001 HCHG ROOM/CARE - MED/SURG/GYN PRIV*

## 2023-09-08 PROCEDURE — 700102 HCHG RX REV CODE 250 W/ 637 OVERRIDE(OP): Performed by: PHYSICIAN ASSISTANT

## 2023-09-08 PROCEDURE — 80048 BASIC METABOLIC PNL TOTAL CA: CPT

## 2023-09-08 PROCEDURE — 97165 OT EVAL LOW COMPLEX 30 MIN: CPT

## 2023-09-08 PROCEDURE — 700111 HCHG RX REV CODE 636 W/ 250 OVERRIDE (IP): Mod: JZ | Performed by: PHYSICIAN ASSISTANT

## 2023-09-08 RX ORDER — KETOROLAC TROMETHAMINE 30 MG/ML
15 INJECTION, SOLUTION INTRAMUSCULAR; INTRAVENOUS EVERY 8 HOURS PRN
Status: DISCONTINUED | OUTPATIENT
Start: 2023-09-08 | End: 2023-09-09 | Stop reason: HOSPADM

## 2023-09-08 RX ADMIN — CEFAZOLIN 2 G: 2 INJECTION, POWDER, FOR SOLUTION INTRAMUSCULAR; INTRAVENOUS at 00:51

## 2023-09-08 RX ADMIN — CEFAZOLIN 2 G: 2 INJECTION, POWDER, FOR SOLUTION INTRAMUSCULAR; INTRAVENOUS at 17:19

## 2023-09-08 RX ADMIN — DOCUSATE SODIUM 100 MG: 100 CAPSULE, LIQUID FILLED ORAL at 05:28

## 2023-09-08 RX ADMIN — DOCUSATE SODIUM 100 MG: 100 CAPSULE, LIQUID FILLED ORAL at 17:16

## 2023-09-08 RX ADMIN — SENNOSIDES AND DOCUSATE SODIUM 1 TABLET: 50; 8.6 TABLET ORAL at 22:23

## 2023-09-08 RX ADMIN — OXYCODONE HYDROCHLORIDE 5 MG: 5 TABLET ORAL at 12:33

## 2023-09-08 RX ADMIN — CEFAZOLIN 2 G: 2 INJECTION, POWDER, FOR SOLUTION INTRAMUSCULAR; INTRAVENOUS at 10:58

## 2023-09-08 RX ADMIN — ANTACID TABLETS 500 MG: 500 TABLET, CHEWABLE ORAL at 17:16

## 2023-09-08 RX ADMIN — OXYCODONE HYDROCHLORIDE 5 MG: 5 TABLET ORAL at 05:18

## 2023-09-08 RX ADMIN — ANTACID TABLETS 500 MG: 500 TABLET, CHEWABLE ORAL at 05:28

## 2023-09-08 RX ADMIN — ENOXAPARIN SODIUM 40 MG: 100 INJECTION SUBCUTANEOUS at 12:33

## 2023-09-08 RX ADMIN — SENNOSIDES AND DOCUSATE SODIUM 1 TABLET: 50; 8.6 TABLET ORAL at 12:33

## 2023-09-08 RX ADMIN — OXYCODONE HYDROCHLORIDE 5 MG: 5 TABLET ORAL at 17:16

## 2023-09-08 RX ADMIN — OXYCODONE HYDROCHLORIDE 5 MG: 5 TABLET ORAL at 22:23

## 2023-09-08 ASSESSMENT — ACTIVITIES OF DAILY LIVING (ADL): TOILETING: INDEPENDENT

## 2023-09-08 ASSESSMENT — COGNITIVE AND FUNCTIONAL STATUS - GENERAL
HELP NEEDED FOR BATHING: A LITTLE
TOILETING: A LITTLE
MOBILITY SCORE: 21
SUGGESTED CMS G CODE MODIFIER DAILY ACTIVITY: CJ
MOVING FROM LYING ON BACK TO SITTING ON SIDE OF FLAT BED: A LITTLE
TURNING FROM BACK TO SIDE WHILE IN FLAT BAD: A LITTLE
SUGGESTED CMS G CODE MODIFIER MOBILITY: CJ
DRESSING REGULAR LOWER BODY CLOTHING: A LITTLE
MOVING TO AND FROM BED TO CHAIR: A LITTLE
DAILY ACTIVITIY SCORE: 21

## 2023-09-08 ASSESSMENT — GAIT ASSESSMENTS
ASSISTIVE DEVICE: FRONT WHEEL WALKER
DEVIATION: BRADYKINETIC
GAIT LEVEL OF ASSIST: SUPERVISED
DISTANCE (FEET): 500

## 2023-09-08 ASSESSMENT — PAIN DESCRIPTION - PAIN TYPE
TYPE: ACUTE PAIN;SURGICAL PAIN

## 2023-09-08 NOTE — ANESTHESIA POSTPROCEDURE EVALUATION
Patient: Blanca Alexander    Procedure Summary     Date: 09/07/23 Room / Location: [facility] 07 / SURGERY [facility]    Anesthesia Start: 1411 Anesthesia Stop: 1613    Procedures:       STAGE #2 - REMOVAL OF LUMBAR 4 SCREW, LUMBAR 3-4 REDO LAMINECTOMY LUMBAR 3-4 POSTERIOR SPINAL FUSION (Back)      LAMINECTOMY, SPINE, LUMBAR, WITH DISCECTOMY (Back)      REMOVAL, HARDWARE (Back) Diagnosis: (LUMBAR SPONDYLOLISTHESIS)    Surgeons: Odin Monzon M.D. Responsible Provider: Adrien Mcdowell M.D.    Anesthesia Type: general ASA Status: 2          Final Anesthesia Type: general  Last vitals  BP   Blood Pressure: 136/76    Temp   36.2 °C (97.1 °F)    Pulse   74   Resp   12    SpO2   98 %      Anesthesia Post Evaluation    Patient location during evaluation: PACU  Patient participation: complete - patient participated  Level of consciousness: awake and alert    Airway patency: patent  Anesthetic complications: no  Cardiovascular status: hemodynamically stable  Respiratory status: acceptable  Hydration status: euvolemic    PONV: none          No notable events documented.     Nurse Pain Score: 6 (NPRS)

## 2023-09-08 NOTE — CARE PLAN
Problem: Pain - Standard  Goal: Alleviation of pain or a reduction in pain to the patient’s comfort goal  Outcome: Progressing     Problem: Neuro Status  Goal: Neuro status will remain stable or improve  Outcome: Progressing   The patient is Stable - Low risk of patient condition declining or worsening    Shift Goals  Clinical Goals: (P) BM, rest, pain management  Patient Goals: (P) rest, BM  Family Goals: (P) n/a    Progress made toward(s) clinical / shift goals:  yes    Patient is not progressing towards the following goals: n/a

## 2023-09-08 NOTE — PROGRESS NOTES
Neurosurgery Progress Note    Subjective:  POD#3 Stage I Left L3/4 XLIF.   POD#1 Stage II L4 HWR, L3/4 redo laminotomy and L3/4 PSF.     Moderate back pain.  Improvements in pre-op radicular pain.   No new lower extremity symptoms, denies XLIF symptoms to left thigh.   Has ambulated.   Voiding, +BM 9/.  Brace is on.    Does report new right posterior hip pain and tingling. Suspect this is from the [facility] PSIS pin.   Feeling a little uncomfortable with ambulation at this point and awaiting PT eval - PT in the room now.         Exam:  Dressings CDI.    Motor 5/5 in bilateral lower extremities.   Sensation intact.       BP  Min: 106/69  Max: 154/99  Pulse  Av.8  Min: 67  Max: 94  Resp  Avg: 15.4  Min: 10  Max: 19  Temp  Av.8 °C (98.2 °F)  Min: 36.1 °C (97 °F)  Max: 37.3 °C (99.2 °F)  Monitored Temp 2  Av.6 °C (97.9 °F)  Min: 36.6 °C (97.9 °F)  Max: 36.6 °C (97.9 °F)  SpO2  Av.8 %  Min: 92 %  Max: 100 %    No data recorded    Recent Labs     23  03423  03523  025   WBC 8.0 8.9 6.9   RBC 4.16* 4.21 3.76*   HEMOGLOBIN 12.0 12.2 10.9*   HEMATOCRIT 37.1 37.5 33.3*   MCV 89.2 89.1 88.6   MCH 28.8 29.0 29.0   MCHC 32.3 32.5 32.7   RDW 49.3 50.3* 51.1*   PLATELETCT 211 218 187   MPV 10.4 10.3 10.1       Recent Labs     23  03523  025   SODIUM 140 137 137   POTASSIUM 5.1 4.0 4.0   CHLORIDE 106 101 102   CO2 26 27 27   GLUCOSE 148* 118* 112*   BUN 13 12 11   CREATININE 0.66 0.59 0.58   CALCIUM 9.2 9.6 9.4                 Intake/Output                         09700 - 23 - 23 0659      Total  Total                 Intake    I.V.  1500  -- 1500  --  -- --    Volume (mL) (Lactated Ringers) 1500 -- 1500 -- -- --    Total Intake 1500 -- 1500 -- -- --       Output    Urine  --  -- --  --  -- --    Number of Times Voided 2 x -- 2 x -- -- --    Blood  50  -- 50  --  -- --    Est. Blood  Loss 50 -- 50 -- -- --    Total Output 50 -- 50 -- -- --       Net I/O     1450 -- 1450 -- -- --              Intake/Output Summary (Last 24 hours) at 9/8/2023 0925  Last data filed at 9/7/2023 1612  Gross per 24 hour   Intake 1500 ml   Output 50 ml   Net 1450 ml               ketorolac  15 mg Q8HRS PRN    enoxaparin (LOVENOX) injection  40 mg DAILY AT NOON    HYDROmorphone  0.75 mg Q3HRS PRN    methocarbamol  750 mg Q6HRS PRN    polyethylene glycol/lytes  1 Packet DAILY    ceFAZolin  2 g Q8HRS    Pharmacy Consult Request  1 Each PHARMACY TO DOSE    docusate sodium  100 mg BID    senna-docusate  1 Tablet Nightly    senna-docusate  1 Tablet Q24HRS PRN    magnesium hydroxide  30 mL QDAY PRN    bisacodyl  10 mg Q24HRS PRN    sodium phosphate  1 Each Once PRN    NS   Continuous    diphenhydrAMINE  25 mg Q6HRS PRN    Or    diphenhydrAMINE  25 mg Q6HRS PRN    ondansetron  4 mg Q4HRS PRN    ondansetron  4 mg Q4HRS PRN    promethazine  12.5-25 mg Q4HRS PRN    promethazine  12.5-25 mg Q4HRS PRN    prochlorperazine  5-10 mg Q4HRS PRN    ALPRAZolam  0.25 mg BID PRN    labetalol  10 mg Q HOUR PRN    benzocaine-menthol  1 Lozenge Q2HRS PRN    calcium carbonate  500 mg BID    HYDROcodone/acetaminophen  1 Tablet Q4HRS PRN    oxyCODONE immediate-release  5 mg Q4HRS PRN       Assessment and Plan:  Hospital day # 4  POD#3/1  PT/OT, encourage OOB. Brace when OOB > 5 min.   Has walker at home.   Ice incision.   Encourage IS.   Plan for home tomorrow after 1 more visit with therapy/when patient feeling more comfortable with ambulation/more adequate pain control.        Chemical prophylactic DVT therapy: Yes - Lovenox 40mg/qd    Start date/time: today

## 2023-09-08 NOTE — THERAPY
"Occupational Therapy   Initial Evaluation     Patient Name: Blanca Alexander  Age:  58 y.o., Sex:  female  Medical Record #: [id number]  Today's Date: 9/8/2023     Precautions  Precautions: Spinal / Back Precautions , Lumbosacral Orthosis  Comments: LSO while EOB    Assessment  Patient is 58 y.o. female s/p 2 stage L3-L4 fusion and removal of hardware. Pt edu on spinal precautions, compensatory strategies during ADLs as well as appropriate AE/DME to maximize independence and safety. She is at a supervision level for basic self care, functional mobility, and functional transfers. She denies any further deficits in self care at this time.      Plan    Occupational Therapy Initial Treatment Plan   Duration: Evaluation only    DC Equipment Recommendations: None  Discharge Recommendations: Anticipate that the patient will have no further occupational therapy needs after discharge from the hospital     Subjective    \"I've prepared for this surgery.\"     Objective       09/08/23 0928   Prior Living Situation   Prior Services Home-Independent   Housing / Facility 2 Story House   Bathroom Set up Walk In Shower;Shower Chair   Equipment Owned Front-Wheel Walker;4-Wheel Walker;Single Point Cane;Tub / Shower Seat;Reacher;Adjustable Bed Without Rails   Lives with - Patient's Self Care Capacity Alone and Able to Care For Self   Comments Pt mentioned a , but reported that someone else was going to be coming to stay with her for about a week. Reports she has already meal prepped and prepped her home.   Prior Level of ADL Function   Self Feeding Independent   Grooming / Hygiene Independent   Bathing Independent   Dressing Independent   Toileting Independent   Prior Level of IADL Function   Medication Management Independent   Laundry Independent   Kitchen Mobility Independent   Finances Independent   Home Management Independent   Shopping Independent   Prior Level Of Mobility Independent Without Device in Community;Independent " Without Device in Home   Driving / Transportation Driving Independent   Occupation (Pre-Hospital Vocational) Employed Full Time;Requires Sitting, Desk, Computer Tasks   Precautions   Precautions Spinal / Back Precautions ;Lumbosacral Orthosis   Comments LSO while EOB   Pain 0 - 10 Group   Therapist Pain Assessment Nurse Notified;During Activity  (min c/o pain in R buttock)   Cognition    Cognition / Consciousness WDL   Level of Consciousness Alert   Comments pleasant and cooperative   Active ROM Upper Body   Active ROM Upper Body  WDL   Strength Upper Body   Upper Body Strength  WDL   Balance Assessment   Sitting Balance (Static) Fair +   Sitting Balance (Dynamic) Fair   Standing Balance (Static) Fair +   Standing Balance (Dynamic) Fair   Weight Shift Sitting Fair   Weight Shift Standing Fair   Comments with fww   Bed Mobility    Supine to Sit Supervised   Scooting Supervised   Rolling Supervised   ADL Assessment   Grooming   (already completed; discussed)   Upper Body Dressing Supervision   Lower Body Dressing Supervision   Toileting   (declined need, discussed)   Comments educated on spinal precautions and compensatory strategies during ADLs   How much help from another person does the patient currently need...   Putting on and taking off regular lower body clothing? 3   Bathing (including washing, rinsing, and drying)? 3   Toileting, which includes using a toilet, bedpan, or urinal? 3   Putting on and taking off regular upper body clothing? 4   Taking care of personal grooming such as brushing teeth? 4   Eating meals? 4   6 Clicks Daily Activity Score 21   Functional Mobility   Sit to Stand Supervised   Bed, Chair, Wheelchair Transfer Supervised   Mobility EOB>BR>Chair   Comments w/ fww

## 2023-09-08 NOTE — PROGRESS NOTES
Patient back on unit from surgery.   A&Ox4. Denies n/t, n/v. States surgical pain of 2/10. Surgical dressing to back CDI. Pt updated on POC. All needs met at this time. Pt is instructed to call when in need of assistance. Bed in lowest and locked position. Call light within reach.

## 2023-09-08 NOTE — THERAPY
Physical Therapy   Initial Evaluation     Patient Name: Blanca Alexander  Age:  58 y.o., Sex:  female  Medical Record #: [id number]  Today's Date: 9/8/2023     Precautions  Precautions: Spinal / Back Precautions ;Lumbosacral Orthosis  Comments: LSO while EOB    Assessment  Patient is 58 y.o. female POD 3 stage 1 Left L3/4 XLIF and POD #1 Stage 2 L4 HWR and redo lami with PSF. Pt educated on log roll and spine precautions. All mobility completed with FWW and no assistance. Recommend pt ambulate for exercise and work on sit to stands prior to outpatient PT. No further acute PT needs.     Plan    Physical Therapy Initial Treatment Plan   Duration: Evaluation only    DC Equipment Recommendations: None  Discharge Recommendations: Recommend outpatient physical therapy services to address higher level deficits        09/08/23 1014   Prior Living Situation   Prior Services Home-Independent   Housing / Facility 2 Story House   Steps Into Home 1   Steps In Home 14   Rail Both Rail (Steps into Home);Both Rail (Steps in Home)   Bathroom Set up Walk In Shower;Shower Chair   Equipment Owned Front-Wheel Walker;4-Wheel Walker;Single Point Cane   Lives with - Patient's Self Care Capacity Alone and Able to Care For Self   Comments Pt has [employer] friends that will be assisting her as needed. She has 2 walks to have one upstairs and one downstairs   Prior Level of Functional Mobility   Bed Mobility Independent   Transfer Status Independent   Ambulation Independent   Ambulation Distance community   Assistive Devices Used None   Stairs Independent   Comments independnet prior   Cognition    Level of Consciousness Alert   Comments receptive   Active ROM Lower Body    Active ROM Lower Body  WDL   Strength Lower Body   Lower Body Strength  WDL   Sensation Lower Body   Lower Extremity Sensation   X   Comments pt reporting parasthesia on R hip   Other Treatments   Other Treatments Provided educated on spine precautions, log roll, and post op  exercising (walking and STS)   Balance Assessment   Sitting Balance (Static) Fair +   Sitting Balance (Dynamic) Fair +   Standing Balance (Static) Fair   Standing Balance (Dynamic) Fair   Weight Shift Sitting Good   Weight Shift Standing Fair   Comments FWW   Bed Mobility    Supine to Sit Supervised   Sit to Supine Supervised   Scooting Supervised   Comments flat bed, no rails   Gait Analysis   Gait Level Of Assist Supervised   Assistive Device Front Wheel Walker   Distance (Feet) 500   # of Times Distance was Traveled 1   Deviation Bradykinetic   # of Stairs Climbed 6   Level of Assist with Stairs Supervised   Comments no LOB   Functional Mobility   Sit to Stand Supervised   Bed, Chair, Wheelchair Transfer Supervised   Transfer Method Stand Step   Mobility in room and hallway with FWW   Education Group   Education Provided Role of Physical Therapist;Spine Precautions   Spine Precautions Patient Response Patient;Acceptance;Explanation;Demonstration;Verbal Demonstration;Action Demonstration   Role of Physical Therapist Patient Response Patient;Acceptance;Explanation;Demonstration;Action Demonstration;Verbal Demonstration   Anticipated Discharge Equipment and Recommendations   DC Equipment Recommendations None   Discharge Recommendations Recommend outpatient physical therapy services to address higher level deficits

## 2023-09-08 NOTE — OR NURSING
Pt's VSS. Pt on 4L NC sating greater than 92%. Pt A&Ox4. Pt denies nausea and has mild pain. Pt's surgical back dressing CDI. Pt being transferred to T324.

## 2023-09-08 NOTE — CARE PLAN
Problem: Pain - Standard  Goal: Alleviation of pain or a reduction in pain to the patient’s comfort goal  Outcome: Progressing     Problem: Knowledge Deficit - Standard  Goal: Patient and family/care givers will demonstrate understanding of plan of care, disease process/condition, diagnostic tests and medications  Outcome: Progressing     Problem: Lumbar/Thoracic Spine Surgery  Goal: Post-Operative Lumbar/Thoracic Spine Surgery: Patient will achieve optimal post-surgical outcomes  Outcome: Progressing     Problem: Neuro Status  Goal: Neuro status will remain stable or improve  Outcome: Progressing   The patient is Stable - Low risk of patient condition declining or worsening    Shift Goals  Clinical Goals: BM, rest, pain management  Patient Goals: rest, BM  Family Goals:  (Not present)    Progress made toward(s) clinical / shift goals:      Patient is not progressing towards the following goals:

## 2023-09-09 ENCOUNTER — PHARMACY VISIT (OUTPATIENT)
Dept: PHARMACY | Facility: MEDICAL CENTER | Age: 58
End: 2023-09-09
Payer: COMMERCIAL

## 2023-09-09 VITALS
WEIGHT: 141.76 LBS | DIASTOLIC BLOOD PRESSURE: 69 MMHG | SYSTOLIC BLOOD PRESSURE: 97 MMHG | BODY MASS INDEX: 24.2 KG/M2 | TEMPERATURE: 98.6 F | OXYGEN SATURATION: 91 % | HEIGHT: 64 IN | HEART RATE: 94 BPM | RESPIRATION RATE: 16 BRPM

## 2023-09-09 LAB
ANION GAP SERPL CALC-SCNC: 9 MMOL/L (ref 7–16)
BUN SERPL-MCNC: 9 MG/DL (ref 8–22)
CALCIUM SERPL-MCNC: 9.1 MG/DL (ref 8.5–10.5)
CHLORIDE SERPL-SCNC: 103 MMOL/L (ref 96–112)
CO2 SERPL-SCNC: 26 MMOL/L (ref 20–33)
CREAT SERPL-MCNC: 0.51 MG/DL (ref 0.5–1.4)
ERYTHROCYTE [DISTWIDTH] IN BLOOD BY AUTOMATED COUNT: 51 FL (ref 35.9–50)
GFR SERPLBLD CREATININE-BSD FMLA CKD-EPI: 107 ML/MIN/1.73 M 2
GLUCOSE SERPL-MCNC: 108 MG/DL (ref 65–99)
HCT VFR BLD AUTO: 32.9 % (ref 37–47)
HGB BLD-MCNC: 10.8 G/DL (ref 12–16)
MCH RBC QN AUTO: 29 PG (ref 27–33)
MCHC RBC AUTO-ENTMCNC: 32.8 G/DL (ref 32.2–35.5)
MCV RBC AUTO: 88.4 FL (ref 81.4–97.8)
PLATELET # BLD AUTO: 194 K/UL (ref 164–446)
PMV BLD AUTO: 10.2 FL (ref 9–12.9)
POTASSIUM SERPL-SCNC: 4 MMOL/L (ref 3.6–5.5)
RBC # BLD AUTO: 3.72 M/UL (ref 4.2–5.4)
SODIUM SERPL-SCNC: 138 MMOL/L (ref 135–145)
WBC # BLD AUTO: 7.3 K/UL (ref 4.8–10.8)

## 2023-09-09 PROCEDURE — 80048 BASIC METABOLIC PNL TOTAL CA: CPT

## 2023-09-09 PROCEDURE — A9270 NON-COVERED ITEM OR SERVICE: HCPCS | Performed by: PHYSICIAN ASSISTANT

## 2023-09-09 PROCEDURE — RXMED WILLOW AMBULATORY MEDICATION CHARGE: Performed by: STUDENT IN AN ORGANIZED HEALTH CARE EDUCATION/TRAINING PROGRAM

## 2023-09-09 PROCEDURE — 700102 HCHG RX REV CODE 250 W/ 637 OVERRIDE(OP): Performed by: PHYSICIAN ASSISTANT

## 2023-09-09 PROCEDURE — 85027 COMPLETE CBC AUTOMATED: CPT

## 2023-09-09 PROCEDURE — 700111 HCHG RX REV CODE 636 W/ 250 OVERRIDE (IP): Performed by: PHYSICIAN ASSISTANT

## 2023-09-09 PROCEDURE — 700111 HCHG RX REV CODE 636 W/ 250 OVERRIDE (IP): Mod: JZ | Performed by: STUDENT IN AN ORGANIZED HEALTH CARE EDUCATION/TRAINING PROGRAM

## 2023-09-09 RX ORDER — DEXAMETHASONE SODIUM PHOSPHATE 4 MG/ML
4 INJECTION, SOLUTION INTRA-ARTICULAR; INTRALESIONAL; INTRAMUSCULAR; INTRAVENOUS; SOFT TISSUE ONCE
Status: COMPLETED | OUTPATIENT
Start: 2023-09-09 | End: 2023-09-09

## 2023-09-09 RX ORDER — METHOCARBAMOL 750 MG/1
750 TABLET, FILM COATED ORAL EVERY 8 HOURS PRN
Qty: 42 TABLET | Refills: 0 | Status: SHIPPED | OUTPATIENT
Start: 2023-09-09 | End: 2023-09-23

## 2023-09-09 RX ORDER — FAMOTIDINE 20 MG/1
20 TABLET, FILM COATED ORAL 2 TIMES DAILY
Qty: 14 TABLET | Refills: 0 | Status: SHIPPED | OUTPATIENT
Start: 2023-09-09 | End: 2023-09-16

## 2023-09-09 RX ORDER — METHYLPREDNISOLONE 4 MG/1
TABLET ORAL
Qty: 21 TABLET | Refills: 0 | Status: SHIPPED | OUTPATIENT
Start: 2023-09-09

## 2023-09-09 RX ORDER — OXYCODONE HYDROCHLORIDE 5 MG/1
5 TABLET ORAL EVERY 4 HOURS PRN
Qty: 42 TABLET | Refills: 0 | Status: SHIPPED | OUTPATIENT
Start: 2023-09-09 | End: 2023-09-16

## 2023-09-09 RX ADMIN — HYDROMORPHONE HYDROCHLORIDE 0.75 MG: 1 INJECTION, SOLUTION INTRAMUSCULAR; INTRAVENOUS; SUBCUTANEOUS at 01:32

## 2023-09-09 RX ADMIN — DOCUSATE SODIUM 100 MG: 100 CAPSULE, LIQUID FILLED ORAL at 06:13

## 2023-09-09 RX ADMIN — DEXAMETHASONE SODIUM PHOSPHATE 4 MG: 4 INJECTION INTRA-ARTICULAR; INTRALESIONAL; INTRAMUSCULAR; INTRAVENOUS; SOFT TISSUE at 09:40

## 2023-09-09 RX ADMIN — ANTACID TABLETS 500 MG: 500 TABLET, CHEWABLE ORAL at 06:12

## 2023-09-09 RX ADMIN — POLYETHYLENE GLYCOL 3350 1 PACKET: 17 POWDER, FOR SOLUTION ORAL at 06:10

## 2023-09-09 ASSESSMENT — PAIN DESCRIPTION - PAIN TYPE: TYPE: ACUTE PAIN

## 2023-09-09 NOTE — PROGRESS NOTES
Neurosurgery Progress Note    Subjective:  POD#4 Stage I Left L3/4 XLIF.   POD#2 Stage II L4 HWR, L3/4 redo laminotomy and L3/4 PSF.     Moderate back pain.  Reports moderate to severe right low back pain, points over SI joint/right gluteal musculature.   Improvements in pre-op radicular pain.   No new lower extremity symptoms, denies XLIF symptoms to left thigh.   Has ambulated.   Voiding, +BM /, + flatus.   Brace at bedside.     PT cleared patient for discharge. Has FWW at home.       Exam:  Dressings CDI.    Motor 5/5 in bilateral lower extremities.   Sensation intact.       BP  Min: 97/69  Max: 139/96  Pulse  Av.8  Min: 86  Max: 94  Resp  Av.6  Min: 16  Max: 18  Temp  Av.2 °C (98.9 °F)  Min: 37 °C (98.6 °F)  Max: 37.3 °C (99.2 °F)  SpO2  Av.6 %  Min: 91 %  Max: 94 %    No data recorded    Recent Labs     23  0356 23  0258 23  0207   WBC 8.9 6.9 7.3   RBC 4.21 3.76* 3.72*   HEMOGLOBIN 12.2 10.9* 10.8*   HEMATOCRIT 37.5 33.3* 32.9*   MCV 89.1 88.6 88.4   MCH 29.0 29.0 29.0   MCHC 32.5 32.7 32.8   RDW 50.3* 51.1* 51.0*   PLATELETCT 218 187 194   MPV 10.3 10.1 10.2       Recent Labs     23  0356 23  0258 23  020   SODIUM 137 137 138   POTASSIUM 4.0 4.0 4.0   CHLORIDE 101 102 103   CO2 27 27 26   GLUCOSE 118* 112* 108*   BUN 12 11 9   CREATININE 0.59 0.58 0.51   CALCIUM 9.6 9.4 9.1                 Intake/Output                         23 - 23 - 09/10/23 0659     [id number] [id number] Total  Total                 Intake    Total Intake -- -- -- -- -- --       Output    Urine  --  -- --  --  -- --    Number of Times Voided -- 1 x 1 x -- -- --    Total Output -- -- -- -- -- --       Net I/O     -- -- -- -- -- --            No intake or output data in the 24 hours ending 23 0857            ketorolac  15 mg Q8HRS PRN    enoxaparin (LOVENOX) injection  40 mg DAILY AT NOON    HYDROmorphone  0.75 mg Q3HRS PRN     methocarbamol  750 mg Q6HRS PRN    polyethylene glycol/lytes  1 Packet DAILY    Pharmacy Consult Request  1 Each PHARMACY TO DOSE    docusate sodium  100 mg BID    senna-docusate  1 Tablet Nightly    senna-docusate  1 Tablet Q24HRS PRN    magnesium hydroxide  30 mL QDAY PRN    bisacodyl  10 mg Q24HRS PRN    sodium phosphate  1 Each Once PRN    NS   Continuous    diphenhydrAMINE  25 mg Q6HRS PRN    Or    diphenhydrAMINE  25 mg Q6HRS PRN    ondansetron  4 mg Q4HRS PRN    ondansetron  4 mg Q4HRS PRN    promethazine  12.5-25 mg Q4HRS PRN    promethazine  12.5-25 mg Q4HRS PRN    prochlorperazine  5-10 mg Q4HRS PRN    ALPRAZolam  0.25 mg BID PRN    labetalol  10 mg Q HOUR PRN    benzocaine-menthol  1 Lozenge Q2HRS PRN    calcium carbonate  500 mg BID    HYDROcodone/acetaminophen  1 Tablet Q4HRS PRN    oxyCODONE immediate-release  5 mg Q4HRS PRN       Assessment and Plan:  Hospital day # 5  POD#4/2  Has walker at home.   Give dex 4mg now prior to discharge for right hip pain.   Patient cleared to discharge home.   Questions answered.   Discharge instructions discussed and patient voiced understanding.   Rx sent to [address]  Patient will follow-up in 2 weeks for already scheduled post-op appointment at [facility].   Call with questions [phone].          Chemical prophylactic DVT therapy: Yes - Lovenox 40mg/qd    Start date/time: today

## 2023-09-09 NOTE — CARE PLAN
Problem: Pain - Standard  Goal: Alleviation of pain or a reduction in pain to the patient’s comfort goal  Outcome: Progressing     Problem: Knowledge Deficit - Standard  Goal: Patient and family/care givers will demonstrate understanding of plan of care, disease process/condition, diagnostic tests and medications  Outcome: Progressing     Problem: Neuro Status  Goal: Neuro status will remain stable or improve  Outcome: Progressing   The patient is Stable - Low risk of patient condition declining or worsening    Shift Goals  Clinical Goals: Neuro Checks, Discharge  Patient Goals: Discharge  Family Goals:  (Not present)    Progress made toward(s) clinical / shift goals:  Neuro Checks per Flowsheets, [facility] [place] [facility] [place], Educated patient on plan of care this shift, patient verbalized understanding, Pain medicated per MAR    Patient is not progressing towards the following goals:

## 2023-09-09 NOTE — PROGRESS NOTES
Assumed care of patient at 0645. Bedside report received. Assessment complete.  AA&Ox4. Denies CP/SOB.  Neuro Checks per Flowsheets   Reporting 7/10 pain.  Declined intervention at this time.  Educated patient regarding pharmacologic and non pharmacologic modalities for pain management.  Skin per flowsheets  Tolerating Regular diet. Denies N/V.  + void. Last BM 9/7  Pt ambulates Independently w LSO Brace when OOB >5 Minutes.  All needs met at this time. Call light within reach. Pt calls appropriately. Bed low and locked, non skid socks in place. Hourly rounding in place.

## 2023-09-09 NOTE — DISCHARGE SUMMARY
Date of Admission: 9/5/2023.  Date of Discharge: 9/9/2023.    Discharge Diagnosis:   1.  L3-L4 degenerative disk disease.  2.  L3-L4 radiculopathies.  3.  L3-L4 grade I spondylolisthesis.  4.  Low back pain refractory to medical care..    Surgery Performed:   Stage I Left L3/4 XLIF on 9/5/23.   Stage II L4 HWR, L3/4 redo laminotomy and L3/4 PSF on 9/7/23.     Complications: None.    Reason for Admission: This is a pleasant 58-year-old lady presents with low back pain, neurogenic claudication, all stemming from L3-L4 lumbar stenosis above a prior L4-L5 TLIF performed elsewhere remotely.  The patient failed conservative care. The patient has tried and failed extensive conservative management. She was hereby indicated for the above surgery. The patient understood risks versus benefits and treatment alternatives and elected to proceed with the operation.    Hospital Course: The patient was admitted on the day of surgeries and underwent the above surgeries without complication.  After each surgery the patient was transferred to the [facility] floor.  Here on the [facility] floor, the patient has made a good recovery postoperatively. Now, on postoperative day #4/2, the patient's pain is well controlled on oral pain medications.  The patient is ambulating, voiding, tolerating oral food and medications well. Motor exam is 5/5. The patient's incision is clean, dry, and intact. The patient is now cleared for discharge home under stable condition after an uneventful hospital stay.    Discharge Instructions: The patient is to ambulate as much as tolerated.  The patient should avoid repetitive bending at the waist. They should avoid pushing, pulling or lifting greater than 15 pounds.  They should wear their LSO brace when out of bed. It is okay to shower but the patient is not to submerge the wound.  The patient shoud ice their incision for 10-15 minutes multiple times per day. It is okay to drive in 2 weeks' time or when comfortable  and when no longer taking narcotic pain medication. They should take over-the-counter stool softeners while taking narcotic pain medications. The patient is to eat a normal diet as tolerated. The patient has a postoperative appointment in 2 weeks' time at [facility]. The patient should call our office or go to the nearest emergency department with any emergent changes. The patient was given these discharge instructions verbally and voiced understanding of them.      Discharge Medications: In addition to the patient's normal home medications, they will be discharged home with prescriptons for,    famotidine 20 MG Tabs  Commonly known as: Pepcid    Take 1 Tablet by mouth 2 times a day for 7 days.  Dose: 20 mg         methocarbamol 750 MG Tabs  Commonly known as: Robaxin    Take 1 Tablet by mouth every 8 hours as needed (spasm) for up to 14 days.  Dose: 750 mg         methylPREDNISolone 4 MG Tbpk  Commonly known as: Medrol Dosepak    Follow schedule on package instructions.         oxyCODONE immediate-release 5 MG Tabs  Commonly known as: Roxicodone    Take 1 Tablet by mouth every four hours as needed for Severe Pain for up to 7 days.  Dose: 5 mg

## 2023-09-09 NOTE — CARE PLAN
Problem: Pain - Standard  Goal: Alleviation of pain or a reduction in pain to the patient’s comfort goal  9/8/2023 2336 by Primitivo Wells R.N.  Outcome: Progressing  9/8/2023 2335 by Primitivo Wells R.N.  Outcome: Progressing     Problem: Knowledge Deficit - Standard  Goal: Patient and family/care givers will demonstrate understanding of plan of care, disease process/condition, diagnostic tests and medications  9/8/2023 2336 by Primitivo Wells R.N.  Outcome: Progressing  9/8/2023 2335 by Primitivo Wells R.N.  Outcome: Progressing     Problem: Lumbar/Thoracic Spine Surgery  Goal: Post-Operative Lumbar/Thoracic Spine Surgery: Patient will achieve optimal post-surgical outcomes  Outcome: Progressing     Problem: Neuro Status  Goal: Neuro status will remain stable or improve  Outcome: Progressing   The patient is Stable - Low risk of patient condition declining or worsening    Shift Goals  Clinical Goals: numbness right hip, rest  Patient Goals: Rest, comfort  Family Goals:  (Not present)    Progress made toward(s) clinical / shift goals:      Patient is not progressing towards the following goals:

## 2023-09-09 NOTE — DISCHARGE INSTRUCTIONS
Discharge Instructions: The patient is to ambulate as much as tolerated.  The patient should avoid repetitive bending at the waist. They should avoid pushing, pulling or lifting greater than 15 pounds.  They should wear their LSO brace when out of bed. It is okay to shower but the patient is not to submerge the wound.  The patient shoud ice their incision for 10-15 minutes multiple times per day. It is okay to drive in 2 weeks' time or when comfortable and when no longer taking narcotic pain medication. They should take over-the-counter stool softeners while taking narcotic pain medications. The patient is to eat a normal diet as tolerated. The patient has a postoperative appointment in 2 weeks' time at [facility]. The patient should call our office or go to the nearest emergency department with any emergent changes. The patient was given these discharge instructions verbally and voiced understanding of them

## 2023-09-15 NOTE — OP REPORT
DATE OF SERVICE:  09/07/2023     PREOPERATIVE DIAGNOSES:  1.  L3-4 radiculopathies.  2.  L3-4 spondylolisthesis.  3.  L3-4 spinal stenosis with neurogenic claudication.  4.  Status post stage I left L3-4 XLIF.     POSTOPERATIVE DIAGNOSES:  1.  L3-4 radiculopathies.  2.  L3-4 spondylolisthesis.  3.  L3-4 spinal stenosis with neurogenic claudication.  4.  Status post stage I left L3-4 XLIF.     PROCEDURES:  Stage II of a planned 2-stage procedure for anterior, posterior,   circumferential decompression and fusion in two separate stages.  Stage II:  1.  Minimally invasive right L3-5 posterolateral arthrodesis.  2.  Right L3-4 pedicle screw fixation with PerformLine percutaneous screw system.  3.  Removal of prior hardware at L4-5.  4.  Exploration of prior fusion at L4-5, which was solid, which was markedly   reduced in bone structure.  5.  SSEPs, EMG monitoring, intraoperative screw stimulation performed by   neuromonitoring associates, which remained stable throughout.  6.  Microscope for microdissection of spinal canal.  7.  Intraoperative use of neuronavigation with XVision system.     SURGEON:  Odin Monzon MD, Neurosurgery and Spine Surgery.     ASSISTANT:  Jordi Rao PA-C     ANESTHESIA:  General endotracheal anesthesia.     ANESTHESIOLOGIST:  Adrien Mcdowell MD     COMPLICATIONS:  None.     ESTIMATED BLOOD LOSS:  Less than 20 mL.     PREOPERATIVE NOTE:  This extremely pleasant 58-year-old lady presents with low   back pain and lower extremity neurogenic claudication and lower extremity   radiculopathies.  MRI showed L3-4 lumbar stenosis with grade I   spondylolisthesis, representing transitional segment stenosis above a prior   L4-5 TLIF performed elsewhere 10 years ago.  The patient failed extensive   conservative care, documented in the clinical notes.  I discussed surgical   procedure, alternatives, goals, risks, benefits, complications in detail with   the patient.  The patient understood and  consented to surgery for above listed   procedure.     DESCRIPTION OF PROCEDURE:  The patient was brought to the operating room and   placed under general endotracheal anesthesia.  She was placed prone on the   operating OSI table with care taken about the bony prominences, peripheral   nerves.  Lumbar region prepped and draped in the usual sterile fashion.    Following localization of cross table fluoroscopy, intraoperatively, a pin was   placed in the left iliac crest and the registration performed by using an   O-arm and obtaining an intraoperative CT scan, which was merged with the   XVision system to allow robotic navigation.  I navigated and made a small   incision over the right side over L3-4 marking out the L4-5 pedicle screws.  I   made the incision and including the fascial incision, I identified the   screws, I backed out the right L4-5 pedicle screws, which were NuVasive screw   system.  I explored the fusion, which certainly did not have much bone at   L4-5.  I then decorticated the transverse process of L3, 4 and 5 on right side   and laid posterolateral arthrodesis with local morselized autograft and   OsteoStrand plus arthrodesis.  I used ____ robotic technology.  I placed   screws in the right L3 and L4 pedicles using SeaSpine screws using 6.5x50 mm   screws.  All screws had excellent purchase.  Intraoperative screw stimulation   stimulated over 20 millivolts.  Precontoured kirit was placed over the polyaxial   screw head, locking screws applied and secured and final tightened.  The   wound was irrigated, hemostasis achieved.  I closed the wound in multiple   layers after placing vancomycin powder in the wound with 0 Vicryl deep fascia,   2-0 Vicryl deep dermal layer, Steri-Strips to skin.  Small sterile dressing   was applied.  Swabs, needles, instruments correct by two count.  No   complications were encountered.  The patient tolerated the procedure, was   stable and transferred to recovery room in  good condition.  This completed   stage II of a planned 2-stage procedure for anterior, posterior,   circumferential decompression and fusion.  The patient was neurologically   intact, doing well and will likely be discharged tomorrow.  The reason for   staging the procedure was to allow indirect decompression at L3-4 and the   patient was doing well postoperatively and hence are more involved, redo   decompression was not required.  In this stage, the patient's considerable   morbidity with a single indirect decompression anteriorly at L3-4 followed by   percutaneous screws with a small incision.  This was far more acceptable for   the patient with a better long-term outcome with less morbidity.        ______________________________  AJMAAL JERNIGAN MD    JBETY/CHENG    DD:  09/14/2023 17:55  DT:  09/14/2023 18:39    Job#:  [id number]    CC:Adrien Herndon DO

## (undated) DEVICE — CHLORAPREP 26 ML APPLICATOR - ORANGE TINT(25/CA)

## (undated) DEVICE — DRAPE LAPAROTOMY T SHEET - (12EA/CA)

## (undated) DEVICE — SET EXTENSION WITH 2 PORTS (48EA/CA) ***PART #2C8610 IS A SUBSTITUTE*****

## (undated) DEVICE — STIM PROBE

## (undated) DEVICE — MEDICINE CUP STERILE 2 OZ - (100/CA)

## (undated) DEVICE — DRAPE LARGE 3 QUARTER - (20/CA)

## (undated) DEVICE — DRAPE PATIENT STERILE FOR USE WITH O OR C ARMS (10EA/BX)

## (undated) DEVICE — NEEDLE BIOPSY JAMSHIDI 8GA X 150MM

## (undated) DEVICE — SUTURE 2-0 VICRYL PLUS CT-1 - 8 X 18 INCH(12/BX)

## (undated) DEVICE — NEEDLE 20 GA X 1 INCH - NON SAFTEY (100/BX)

## (undated) DEVICE — KIT SURGIFLO W/OUT THROMBIN - (6EA/CA)

## (undated) DEVICE — KNIFE ANNULOTOMY

## (undated) DEVICE — GLOVE SZ 7 BIOGEL PI MICRO - PF LF (50PR/BX 4BX/CA)

## (undated) DEVICE — LACTATED RINGERS INJ 1000 ML - (14EA/CA 60CA/PF)

## (undated) DEVICE — SET LEADWIRE 5 LEAD BEDSIDE DISPOSABLE ECG (1SET OF 5/EA)

## (undated) DEVICE — PACK JACKSON TABLE KIT W/OUT - HR (6EA/CA)

## (undated) DEVICE — DRAPE C ARMOR (12EA/CA)

## (undated) DEVICE — GLOVE BIOGEL INDICATOR SZ 7.5 SURGICAL PF LTX - (50PR/BX 4BX/CA)

## (undated) DEVICE — ATTACHMENT RETRACTOR DISC SHIM TSI

## (undated) DEVICE — DRAPE 36X28IN RAD CARM BND BG - (25/CA) O

## (undated) DEVICE — GOWN SURGICAL XX-LARGE - (28EA/CA) SIRUS NON REINFORCED

## (undated) DEVICE — TOWELS CLOTH SURGICAL - (4/PK 20PK/CA)

## (undated) DEVICE — TOWEL STOP TIMEOUT SAFETY FLAG (40EA/CA)

## (undated) DEVICE — GOWN WARMING STANDARD FLEX - (30/CA)

## (undated) DEVICE — GOWN SURGEONS X-LARGE - DISP. (30/CA)

## (undated) DEVICE — LIGHT SOURCE MIS 12FT

## (undated) DEVICE — SYRINGE DISP. 60 CC LL - (30/BX, 12BX/CA)**WHEN THESE ARE GONE ORDER #500206**

## (undated) DEVICE — GLOVE PROTEXIS PI MICRO SZ 8.5 (200PR/CA)

## (undated) DEVICE — SYRINGE NON SAFETY 10 CC 20 GA X 1-1/2 IN (100/BX 4BX/CA)

## (undated) DEVICE — GLOVE BIOGEL INDICATOR SZ 8 SURGICAL PF LTX - (50/BX 4BX/CA)

## (undated) DEVICE — SENSOR OXIMETER ADULT SPO2 RD SET (20EA/BX)

## (undated) DEVICE — GLOVE, BIOGEL ECLIPSE, SZ 7.0, PF LTX (50/BX)

## (undated) DEVICE — GLOVE BIOGEL PI INDICATOR SZ 6.5 SURGICAL PF LF - (50/BX 4BX/CA)

## (undated) DEVICE — DRESSING TRANSPARENT FILM TEGADERM 4 X 4.75" (50EA/BX)"

## (undated) DEVICE — PACK NEURO - (2EA/CA)

## (undated) DEVICE — BOVIE  BLADE 6 EXTENDED - (50/PK)

## (undated) DEVICE — TOOL MR8 21CM MATCH HEAD 3MM DIAMETER (1/EA)

## (undated) DEVICE — GLOVE BIOGEL SZ 7 SURGICAL PF LTX - (50PR/BX 4BX/CA)

## (undated) DEVICE — GLOVE BIOGEL SZ 8 SURGICAL PF LTX - (50PR/BX 4BX/CA)

## (undated) DEVICE — LIGHTSOURCE LONG ROUND PHOTONSABER 12FR

## (undated) DEVICE — TRAY CATHETER FOLEY URINE METER W/STATLOCK 350ML (10EA/CA)

## (undated) DEVICE — INTRAOP NEURO IN OR 1:1 PER 15 MIN

## (undated) DEVICE — GLOVE SZ 8 BIOGEL PI MICRO - PF LF (50PR/BX)

## (undated) DEVICE — DRAPE STRLE REG TOWEL 18X24 - (10/BX 4BX/CA)"

## (undated) DEVICE — SODIUM CHL IRRIGATION 0.9% 1000ML (12EA/CA)

## (undated) DEVICE — DILATOR KIT

## (undated) DEVICE — TUBING CLEARLINK DUO-VENT - C-FLO (48EA/CA)

## (undated) DEVICE — FIBRILLAR SURGICEL 4X4 - 10/CA

## (undated) DEVICE — SPONGE GAUZESTER 4 X 4 4PLY - (128PK/CA)

## (undated) DEVICE — ELECTRODE DUAL RETURN W/ CORD - (50/PK)

## (undated) DEVICE — COVER MAYO STAND X-LG - (22EA/CA)

## (undated) DEVICE — CANISTER SUCTION 3000ML MECHANICAL FILTER AUTO SHUTOFF MEDI-VAC NONSTERILE LF DISP  (40EA/CA)

## (undated) DEVICE — PIN PERC 125MM (5EA/PK)

## (undated) DEVICE — SUTURE 1 VICRYL PLUS CT-1 - 18 INCH (12/BX)

## (undated) DEVICE — FORCEPS BIPOLAR

## (undated) DEVICE — HEADREST PRONEVIEW LARGE - (10/CA)

## (undated) DEVICE — DRAPE MAYO STAND - (30/CA)

## (undated) DEVICE — TOOL MR8 14CM MATCH HD DMD 3MM DIAMETER (1/EA)

## (undated) DEVICE — SOD. CHL. INJ. 0.9% 1000 ML - (14EA/CA 60CA/PF)

## (undated) DEVICE — CLOSURE SKIN STRIP 1/2 X 4 IN - (STERI STRIP) (50/BX 4BX/CA)

## (undated) DEVICE — SUCTION INSTRUMENT YANKAUER BULBOUS TIP W/O VENT (50EA/CA)

## (undated) DEVICE — ARMREST CRADLE FOAM - (2PR/PK 12PR/CA)

## (undated) DEVICE — SUTURE GENERAL

## (undated) DEVICE — GLOVE PROTEXIS W/NEU-THERA SZ 8.5 (50PR/BX)

## (undated) DEVICE — MIDAS LUBRICATOR DIFFUSER PACK (4EA/CA)

## (undated) DEVICE — BLADE SURGICAL CLIPPER - (50EA/CA)

## (undated) DEVICE — LACTATED RINGERS INJ. 500 ML - (24EA/CA)

## (undated) DEVICE — SLEEVE VASO CALF MED - (10PR/CA)

## (undated) DEVICE — SET EPIDURAL BURRON NON-SAFETY (12EA/CA)

## (undated) DEVICE — DRAPE SRG LG BCK TBL DISP - 10/CA

## (undated) DEVICE — SPONGE GAUZESTER. 2X2 4-PL - (2/PK 50PK/BX 30BX/CS)

## (undated) DEVICE — BAG SPONGE COUNT 10.25 X 32 - BLUE (250/CA)

## (undated) DEVICE — DRAPE SURG STERI-DRAPE 7X11OD - (40EA/CA)

## (undated) DEVICE — COVER LIGHT HANDLE ALC PLUS DISP (18EA/BX)

## (undated) DEVICE — TUBING C&T SET FLYING LEADS DRAIN TUBING (10EA/BX)

## (undated) DEVICE — SLEEVE, VASO, THIGH, MED